# Patient Record
Sex: FEMALE | Race: WHITE | NOT HISPANIC OR LATINO | Employment: FULL TIME | URBAN - METROPOLITAN AREA
[De-identification: names, ages, dates, MRNs, and addresses within clinical notes are randomized per-mention and may not be internally consistent; named-entity substitution may affect disease eponyms.]

---

## 2020-01-28 ENCOUNTER — HISTORICAL (OUTPATIENT)
Dept: RADIOLOGY | Facility: HOSPITAL | Age: 30
End: 2020-01-28

## 2020-01-29 ENCOUNTER — HISTORICAL (OUTPATIENT)
Dept: RADIOLOGY | Facility: HOSPITAL | Age: 30
End: 2020-01-29

## 2020-02-13 ENCOUNTER — HISTORICAL (OUTPATIENT)
Dept: HEMATOLOGY/ONCOLOGY | Facility: CLINIC | Age: 30
End: 2020-02-13

## 2020-02-26 ENCOUNTER — HISTORICAL (OUTPATIENT)
Dept: RADIOLOGY | Facility: HOSPITAL | Age: 30
End: 2020-02-26

## 2020-02-27 ENCOUNTER — HISTORICAL (OUTPATIENT)
Dept: PREADMISSION TESTING | Facility: HOSPITAL | Age: 30
End: 2020-02-27

## 2020-02-27 LAB
ABS NEUT (OLG): 5.41 X10(3)/MCL (ref 2.1–9.2)
ALBUMIN SERPL-MCNC: 4.2 GM/DL (ref 3.4–5)
ALBUMIN/GLOB SERPL: 1.3 {RATIO}
ALP SERPL-CCNC: 81 UNIT/L (ref 38–126)
ALT SERPL-CCNC: 22 UNIT/L (ref 12–78)
AST SERPL-CCNC: 13 UNIT/L (ref 15–37)
BASOPHILS # BLD AUTO: 0 X10(3)/MCL (ref 0–0.2)
BASOPHILS NFR BLD AUTO: 0 %
BILIRUB SERPL-MCNC: 0.5 MG/DL (ref 0.2–1)
BILIRUBIN DIRECT+TOT PNL SERPL-MCNC: 0.1 MG/DL (ref 0–0.2)
BILIRUBIN DIRECT+TOT PNL SERPL-MCNC: 0.4 MG/DL (ref 0–0.8)
BUN SERPL-MCNC: 10 MG/DL (ref 7–18)
CALCIUM SERPL-MCNC: 9.5 MG/DL (ref 8.5–10.1)
CHLORIDE SERPL-SCNC: 105 MMOL/L (ref 98–107)
CO2 SERPL-SCNC: 27 MMOL/L (ref 21–32)
CREAT SERPL-MCNC: 0.79 MG/DL (ref 0.55–1.02)
EOSINOPHIL # BLD AUTO: 0.1 X10(3)/MCL (ref 0–0.9)
EOSINOPHIL NFR BLD AUTO: 1 %
ERYTHROCYTE [DISTWIDTH] IN BLOOD BY AUTOMATED COUNT: 12.1 % (ref 11.5–17)
GLOBULIN SER-MCNC: 3.3 GM/DL (ref 2.4–3.5)
GLUCOSE SERPL-MCNC: 73 MG/DL (ref 74–106)
HCT VFR BLD AUTO: 40.5 % (ref 37–47)
HGB BLD-MCNC: 13.1 GM/DL (ref 12–16)
LYMPHOCYTES # BLD AUTO: 1.7 X10(3)/MCL (ref 0.6–4.6)
LYMPHOCYTES NFR BLD AUTO: 21 %
MCH RBC QN AUTO: 29.8 PG (ref 27–31)
MCHC RBC AUTO-ENTMCNC: 32.3 GM/DL (ref 33–36)
MCV RBC AUTO: 92 FL (ref 80–94)
MONOCYTES # BLD AUTO: 0.6 X10(3)/MCL (ref 0.1–1.3)
MONOCYTES NFR BLD AUTO: 8 %
NEUTROPHILS # BLD AUTO: 5.41 X10(3)/MCL (ref 2.1–9.2)
NEUTROPHILS NFR BLD AUTO: 69 %
PLATELET # BLD AUTO: 332 X10(3)/MCL (ref 130–400)
PMV BLD AUTO: 11.2 FL (ref 9.4–12.4)
POTASSIUM SERPL-SCNC: 4.4 MMOL/L (ref 3.5–5.1)
PROT SERPL-MCNC: 7.5 GM/DL (ref 6.4–8.2)
RBC # BLD AUTO: 4.4 X10(6)/MCL (ref 4.2–5.4)
SODIUM SERPL-SCNC: 137 MMOL/L (ref 136–145)
WBC # SPEC AUTO: 7.9 X10(3)/MCL (ref 4.5–11.5)

## 2020-03-06 ENCOUNTER — HISTORICAL (OUTPATIENT)
Dept: SURGERY | Facility: HOSPITAL | Age: 30
End: 2020-03-06

## 2020-03-06 LAB — POC BETA-HCG (QUAL): NEGATIVE

## 2020-04-02 ENCOUNTER — HISTORICAL (OUTPATIENT)
Dept: ADMINISTRATIVE | Facility: HOSPITAL | Age: 30
End: 2020-04-02

## 2020-04-02 LAB
ABS NEUT (OLG): 4.21 X10(3)/MCL (ref 2.1–9.2)
ALBUMIN SERPL-MCNC: 4.1 GM/DL (ref 3.5–5)
ALBUMIN/GLOB SERPL: 1.2 RATIO (ref 1.1–2)
ALP SERPL-CCNC: 81 UNIT/L (ref 40–150)
ALT SERPL-CCNC: 12 UNIT/L (ref 0–55)
AST SERPL-CCNC: 16 UNIT/L (ref 5–34)
BASOPHILS # BLD AUTO: 0 X10(3)/MCL (ref 0–0.2)
BASOPHILS NFR BLD AUTO: 0.4 %
BILIRUB SERPL-MCNC: 0.4 MG/DL
BILIRUBIN DIRECT+TOT PNL SERPL-MCNC: 0.1 MG/DL (ref 0–0.5)
BILIRUBIN DIRECT+TOT PNL SERPL-MCNC: 0.3 MG/DL (ref 0–0.8)
BUN SERPL-MCNC: 9 MG/DL (ref 7–18.7)
CALCIUM SERPL-MCNC: 9.3 MG/DL (ref 8.4–10.2)
CHLORIDE SERPL-SCNC: 106 MMOL/L (ref 98–107)
CO2 SERPL-SCNC: 25 MMOL/L (ref 22–29)
CREAT SERPL-MCNC: 0.8 MG/DL (ref 0.55–1.02)
EOSINOPHIL # BLD AUTO: 0.1 X10(3)/MCL (ref 0–0.9)
EOSINOPHIL NFR BLD AUTO: 1.6 %
ERYTHROCYTE [DISTWIDTH] IN BLOOD BY AUTOMATED COUNT: 11.8 % (ref 11.5–17)
GLOBULIN SER-MCNC: 3.3 GM/DL (ref 2.4–3.5)
GLUCOSE SERPL-MCNC: 84 MG/DL (ref 74–100)
HCT VFR BLD AUTO: 36.8 % (ref 37–47)
HGB BLD-MCNC: 12.2 GM/DL (ref 12–16)
LYMPHOCYTES # BLD AUTO: 1.8 X10(3)/MCL (ref 0.6–4.6)
LYMPHOCYTES NFR BLD AUTO: 27.5 %
MCH RBC QN AUTO: 29.8 PG (ref 27–31)
MCHC RBC AUTO-ENTMCNC: 33.2 GM/DL (ref 33–36)
MCV RBC AUTO: 90 FL (ref 80–94)
MONOCYTES # BLD AUTO: 0.5 X10(3)/MCL (ref 0.1–1.3)
MONOCYTES NFR BLD AUTO: 7.3 %
NEUTROPHILS # BLD AUTO: 4.2 X10(3)/MCL (ref 2.1–9.2)
NEUTROPHILS NFR BLD AUTO: 63.1 %
PLATELET # BLD AUTO: 324 X10(3)/MCL (ref 130–400)
PMV BLD AUTO: 9.8 FL (ref 9.4–12.4)
POTASSIUM SERPL-SCNC: 4.3 MMOL/L (ref 3.5–5.1)
PROT SERPL-MCNC: 7.4 GM/DL (ref 6.4–8.3)
RBC # BLD AUTO: 4.09 X10(6)/MCL (ref 4.2–5.4)
SODIUM SERPL-SCNC: 139 MMOL/L (ref 136–145)
WBC # SPEC AUTO: 6.7 X10(3)/MCL (ref 4.5–11.5)

## 2020-04-13 ENCOUNTER — HISTORICAL (OUTPATIENT)
Dept: CARDIOLOGY | Facility: HOSPITAL | Age: 30
End: 2020-04-13

## 2020-04-15 ENCOUNTER — HISTORICAL (OUTPATIENT)
Dept: INFUSION THERAPY | Facility: HOSPITAL | Age: 30
End: 2020-04-15

## 2020-04-23 ENCOUNTER — HISTORICAL (OUTPATIENT)
Dept: INFUSION THERAPY | Facility: HOSPITAL | Age: 30
End: 2020-04-23

## 2020-04-23 ENCOUNTER — HISTORICAL (OUTPATIENT)
Dept: ADMINISTRATIVE | Facility: HOSPITAL | Age: 30
End: 2020-04-23

## 2020-04-23 LAB
ABS NEUT (OLG): 4.93 X10(3)/MCL (ref 2.1–9.2)
ANION GAP SERPL CALC-SCNC: 16 MMOL/L
B-HCG SERPL QL: NEGATIVE
BASOPHILS # BLD AUTO: 0 X10(3)/MCL (ref 0–0.2)
BASOPHILS NFR BLD AUTO: 0.4 %
BUN SERPL-MCNC: 13 MG/DL (ref 8–26)
CHLORIDE SERPL-SCNC: 104 MMOL/L (ref 98–109)
CREAT SERPL-MCNC: 0.8 MG/DL (ref 0.6–1.3)
EOSINOPHIL # BLD AUTO: 0.2 X10(3)/MCL (ref 0–0.9)
EOSINOPHIL NFR BLD AUTO: 2.1 %
ERYTHROCYTE [DISTWIDTH] IN BLOOD BY AUTOMATED COUNT: 12.3 % (ref 11.5–17)
GLUCOSE SERPL-MCNC: 95 MG/DL (ref 70–105)
HCT VFR BLD AUTO: 36.7 % (ref 37–47)
HCT VFR BLD CALC: 36 % (ref 38–51)
HGB BLD-MCNC: 12.1 GM/DL (ref 12–16)
HGB BLD-MCNC: 12.2 MG/DL (ref 12–17)
LYMPHOCYTES # BLD AUTO: 1.6 X10(3)/MCL (ref 0.6–4.6)
LYMPHOCYTES NFR BLD AUTO: 21.1 %
MCH RBC QN AUTO: 29.9 PG (ref 27–31)
MCHC RBC AUTO-ENTMCNC: 33 GM/DL (ref 33–36)
MCV RBC AUTO: 90.6 FL (ref 80–94)
MONOCYTES # BLD AUTO: 0.8 X10(3)/MCL (ref 0.1–1.3)
MONOCYTES NFR BLD AUTO: 10.3 %
NEUTROPHILS # BLD AUTO: 4.9 X10(3)/MCL (ref 2.1–9.2)
NEUTROPHILS NFR BLD AUTO: 66 %
PLATELET # BLD AUTO: 296 X10(3)/MCL (ref 130–400)
PMV BLD AUTO: 10 FL (ref 9.4–12.4)
POC IONIZED CALCIUM: 1.17 MMOL/L (ref 1.12–1.32)
POC TCO2: 22 MMOL/L (ref 24–29)
POTASSIUM BLD-SCNC: 4.1 MMOL/L (ref 3.5–4.9)
RBC # BLD AUTO: 4.05 X10(6)/MCL (ref 4.2–5.4)
SODIUM BLD-SCNC: 137 MMOL/L (ref 138–146)
WBC # SPEC AUTO: 7.5 X10(3)/MCL (ref 4.5–11.5)

## 2020-04-30 ENCOUNTER — HISTORICAL (OUTPATIENT)
Dept: INFUSION THERAPY | Facility: HOSPITAL | Age: 30
End: 2020-04-30

## 2020-05-07 ENCOUNTER — HISTORICAL (OUTPATIENT)
Dept: INFUSION THERAPY | Facility: HOSPITAL | Age: 30
End: 2020-05-07

## 2020-05-07 LAB
ABS NEUT (OLG): 9.51 X10(3)/MCL (ref 2.1–9.2)
ALBUMIN SERPL-MCNC: 4.2 GM/DL (ref 3.5–5)
ALBUMIN/GLOB SERPL: 1.4 RATIO (ref 1.1–2)
ALP SERPL-CCNC: 120 UNIT/L (ref 40–150)
ALT SERPL-CCNC: 34 UNIT/L (ref 0–55)
AST SERPL-CCNC: 19 UNIT/L (ref 5–34)
BASOPHILS # BLD AUTO: 0 X10(3)/MCL (ref 0–0.2)
BASOPHILS NFR BLD AUTO: 0.4 %
BILIRUB SERPL-MCNC: 0.3 MG/DL
BILIRUBIN DIRECT+TOT PNL SERPL-MCNC: 0.1 MG/DL (ref 0–0.5)
BILIRUBIN DIRECT+TOT PNL SERPL-MCNC: 0.2 MG/DL (ref 0–0.8)
BUN SERPL-MCNC: 9 MG/DL (ref 7–18.7)
CALCIUM SERPL-MCNC: 9.1 MG/DL (ref 8.4–10.2)
CHLORIDE SERPL-SCNC: 107 MMOL/L (ref 98–107)
CO2 SERPL-SCNC: 25 MMOL/L (ref 22–29)
CREAT SERPL-MCNC: 0.75 MG/DL (ref 0.55–1.02)
EOSINOPHIL # BLD AUTO: 0 X10(3)/MCL (ref 0–0.9)
EOSINOPHIL NFR BLD AUTO: 0.3 %
ERYTHROCYTE [DISTWIDTH] IN BLOOD BY AUTOMATED COUNT: 12.6 % (ref 11.5–17)
GLOBULIN SER-MCNC: 3 GM/DL (ref 2.4–3.5)
GLUCOSE SERPL-MCNC: 78 MG/DL (ref 74–100)
HCT VFR BLD AUTO: 35 % (ref 37–47)
HGB BLD-MCNC: 11.5 GM/DL (ref 12–16)
LYMPHOCYTES # BLD AUTO: 2.3 X10(3)/MCL (ref 0.6–4.6)
LYMPHOCYTES NFR BLD AUTO: 18.4 %
MCH RBC QN AUTO: 29.7 PG (ref 27–31)
MCHC RBC AUTO-ENTMCNC: 32.9 GM/DL (ref 33–36)
MCV RBC AUTO: 90.4 FL (ref 80–94)
MONOCYTES # BLD AUTO: 0.6 X10(3)/MCL (ref 0.1–1.3)
MONOCYTES NFR BLD AUTO: 4.8 %
NEUTROPHILS # BLD AUTO: 9.5 X10(3)/MCL (ref 2.1–9.2)
NEUTROPHILS NFR BLD AUTO: 74.8 %
PLATELET # BLD AUTO: 227 X10(3)/MCL (ref 130–400)
PMV BLD AUTO: 10 FL (ref 9.4–12.4)
POTASSIUM SERPL-SCNC: 3.8 MMOL/L (ref 3.5–5.1)
PROT SERPL-MCNC: 7.2 GM/DL (ref 6.4–8.3)
RBC # BLD AUTO: 3.87 X10(6)/MCL (ref 4.2–5.4)
SODIUM SERPL-SCNC: 141 MMOL/L (ref 136–145)
WBC # SPEC AUTO: 12.7 X10(3)/MCL (ref 4.5–11.5)

## 2020-05-14 ENCOUNTER — HISTORICAL (OUTPATIENT)
Dept: INFUSION THERAPY | Facility: HOSPITAL | Age: 30
End: 2020-05-14

## 2020-05-14 LAB
ABS NEUT (OLG): 4.29 X10(3)/MCL (ref 2.1–9.2)
ANION GAP SERPL CALC-SCNC: 14 MMOL/L
BASOPHILS # BLD AUTO: 0 X10(3)/MCL (ref 0–0.2)
BASOPHILS NFR BLD AUTO: 0.5 %
BUN SERPL-MCNC: 13 MG/DL (ref 8–26)
CHLORIDE SERPL-SCNC: 108 MMOL/L (ref 98–109)
CREAT SERPL-MCNC: 0.7 MG/DL (ref 0.6–1.3)
EOSINOPHIL # BLD AUTO: 0 X10(3)/MCL (ref 0–0.9)
EOSINOPHIL NFR BLD AUTO: 0.5 %
ERYTHROCYTE [DISTWIDTH] IN BLOOD BY AUTOMATED COUNT: 13 % (ref 11.5–17)
GLUCOSE SERPL-MCNC: 93 MG/DL (ref 70–105)
HCT VFR BLD AUTO: 34.9 % (ref 37–47)
HCT VFR BLD CALC: 35 % (ref 38–51)
HGB BLD-MCNC: 11.6 GM/DL (ref 12–16)
HGB BLD-MCNC: 11.9 MG/DL (ref 12–17)
LYMPHOCYTES # BLD AUTO: 1.4 X10(3)/MCL (ref 0.6–4.6)
LYMPHOCYTES NFR BLD AUTO: 21.1 %
MCH RBC QN AUTO: 30.1 PG (ref 27–31)
MCHC RBC AUTO-ENTMCNC: 33.2 GM/DL (ref 33–36)
MCV RBC AUTO: 90.4 FL (ref 80–94)
MONOCYTES # BLD AUTO: 0.7 X10(3)/MCL (ref 0.1–1.3)
MONOCYTES NFR BLD AUTO: 10.5 %
NEUTROPHILS # BLD AUTO: 4.3 X10(3)/MCL (ref 2.1–9.2)
NEUTROPHILS NFR BLD AUTO: 67.1 %
PLATELET # BLD AUTO: 403 X10(3)/MCL (ref 130–400)
PMV BLD AUTO: 9.9 FL (ref 9.4–12.4)
POC IONIZED CALCIUM: 1.11 MMOL/L (ref 1.12–1.32)
POC TCO2: 21 MMOL/L (ref 24–29)
POTASSIUM BLD-SCNC: 4.1 MMOL/L (ref 3.5–4.9)
RBC # BLD AUTO: 3.86 X10(6)/MCL (ref 4.2–5.4)
SODIUM BLD-SCNC: 138 MMOL/L (ref 138–146)
WBC # SPEC AUTO: 6.4 X10(3)/MCL (ref 4.5–11.5)

## 2020-05-21 ENCOUNTER — HISTORICAL (OUTPATIENT)
Dept: INFUSION THERAPY | Facility: HOSPITAL | Age: 30
End: 2020-05-21

## 2020-05-28 ENCOUNTER — HISTORICAL (OUTPATIENT)
Dept: INFUSION THERAPY | Facility: HOSPITAL | Age: 30
End: 2020-05-28

## 2020-06-03 ENCOUNTER — HISTORICAL (OUTPATIENT)
Dept: INFUSION THERAPY | Facility: HOSPITAL | Age: 30
End: 2020-06-03

## 2020-06-03 LAB
ABS NEUT (OLG): 3.49 X10(3)/MCL (ref 2.1–9.2)
ALBUMIN SERPL-MCNC: 3.8 GM/DL (ref 3.5–5)
ALP SERPL-CCNC: 97 UNIT/L (ref 40–150)
ALT SERPL-CCNC: 23 UNIT/L (ref 0–55)
ANION GAP SERPL CALC-SCNC: 16 MMOL/L
AST SERPL-CCNC: 17 UNIT/L (ref 5–34)
B-HCG SERPL QL: NEGATIVE
BASOPHILS # BLD AUTO: 0 X10(3)/MCL (ref 0–0.2)
BASOPHILS NFR BLD AUTO: 0.7 %
BILIRUB SERPL-MCNC: 0.3 MG/DL
BILIRUBIN DIRECT+TOT PNL SERPL-MCNC: 0.1 MG/DL (ref 0–0.5)
BILIRUBIN DIRECT+TOT PNL SERPL-MCNC: 0.2 MG/DL (ref 0–0.8)
BUN SERPL-MCNC: 9 MG/DL (ref 8–26)
CHLORIDE SERPL-SCNC: 105 MMOL/L (ref 98–109)
CREAT SERPL-MCNC: 0.7 MG/DL (ref 0.6–1.3)
EOSINOPHIL # BLD AUTO: 0.1 X10(3)/MCL (ref 0–0.9)
EOSINOPHIL NFR BLD AUTO: 1 %
ERYTHROCYTE [DISTWIDTH] IN BLOOD BY AUTOMATED COUNT: 13.3 % (ref 11.5–17)
GLUCOSE SERPL-MCNC: 87 MG/DL (ref 70–105)
HCT VFR BLD AUTO: 33.5 % (ref 37–47)
HCT VFR BLD CALC: 33 % (ref 38–51)
HGB BLD-MCNC: 11 GM/DL (ref 12–16)
HGB BLD-MCNC: 11.2 MG/DL (ref 12–17)
LIVER PROFILE INTERP: NORMAL
LYMPHOCYTES # BLD AUTO: 1.8 X10(3)/MCL (ref 0.6–4.6)
LYMPHOCYTES NFR BLD AUTO: 29.5 %
MCH RBC QN AUTO: 29.9 PG (ref 27–31)
MCHC RBC AUTO-ENTMCNC: 32.8 GM/DL (ref 33–36)
MCV RBC AUTO: 91 FL (ref 80–94)
MONOCYTES # BLD AUTO: 0.6 X10(3)/MCL (ref 0.1–1.3)
MONOCYTES NFR BLD AUTO: 10.8 %
NEUTROPHILS # BLD AUTO: 3.5 X10(3)/MCL (ref 2.1–9.2)
NEUTROPHILS NFR BLD AUTO: 57.8 %
PLATELET # BLD AUTO: 304 X10(3)/MCL (ref 130–400)
PMV BLD AUTO: 9.9 FL (ref 9.4–12.4)
POC IONIZED CALCIUM: 1.24 MMOL/L (ref 1.12–1.32)
POC TCO2: 25 MMOL/L (ref 24–29)
POTASSIUM BLD-SCNC: 3.9 MMOL/L (ref 3.5–4.9)
PROT SERPL-MCNC: 7 GM/DL (ref 6.4–8.3)
RBC # BLD AUTO: 3.68 X10(6)/MCL (ref 4.2–5.4)
SODIUM BLD-SCNC: 142 MMOL/L (ref 138–146)
WBC # SPEC AUTO: 6 X10(3)/MCL (ref 4.5–11.5)

## 2020-06-11 ENCOUNTER — HISTORICAL (OUTPATIENT)
Dept: INFUSION THERAPY | Facility: HOSPITAL | Age: 30
End: 2020-06-11

## 2020-06-18 ENCOUNTER — HISTORICAL (OUTPATIENT)
Dept: INFUSION THERAPY | Facility: HOSPITAL | Age: 30
End: 2020-06-18

## 2020-06-24 ENCOUNTER — HISTORICAL (OUTPATIENT)
Dept: INFUSION THERAPY | Facility: HOSPITAL | Age: 30
End: 2020-06-24

## 2020-06-24 LAB
ABS NEUT (OLG): 3.27 X10(3)/MCL (ref 2.1–9.2)
ALBUMIN SERPL-MCNC: 3.8 GM/DL (ref 3.5–5)
ALP SERPL-CCNC: 93 UNIT/L (ref 40–150)
ALT SERPL-CCNC: 28 UNIT/L (ref 0–55)
ANION GAP SERPL CALC-SCNC: 16 MMOL/L
AST SERPL-CCNC: 18 UNIT/L (ref 5–34)
BASOPHILS # BLD AUTO: 0 X10(3)/MCL (ref 0–0.2)
BASOPHILS NFR BLD AUTO: 0.7 %
BILIRUB SERPL-MCNC: 0.3 MG/DL
BILIRUBIN DIRECT+TOT PNL SERPL-MCNC: 0.1 MG/DL (ref 0–0.5)
BILIRUBIN DIRECT+TOT PNL SERPL-MCNC: 0.2 MG/DL (ref 0–0.8)
BUN SERPL-MCNC: 11 MG/DL (ref 8–26)
CHLORIDE SERPL-SCNC: 106 MMOL/L (ref 98–109)
CREAT SERPL-MCNC: 0.7 MG/DL (ref 0.6–1.3)
EOSINOPHIL # BLD AUTO: 0 X10(3)/MCL (ref 0–0.9)
EOSINOPHIL NFR BLD AUTO: 0.9 %
ERYTHROCYTE [DISTWIDTH] IN BLOOD BY AUTOMATED COUNT: 14.4 % (ref 11.5–17)
GLUCOSE SERPL-MCNC: 93 MG/DL (ref 70–105)
HCT VFR BLD AUTO: 33.5 % (ref 37–47)
HCT VFR BLD CALC: 35 % (ref 38–51)
HGB BLD-MCNC: 10.9 GM/DL (ref 12–16)
HGB BLD-MCNC: 11.9 MG/DL (ref 12–17)
LIVER PROFILE INTERP: NORMAL
LYMPHOCYTES # BLD AUTO: 1.5 X10(3)/MCL (ref 0.6–4.6)
LYMPHOCYTES NFR BLD AUTO: 27.8 %
MCH RBC QN AUTO: 30.4 PG (ref 27–31)
MCHC RBC AUTO-ENTMCNC: 32.5 GM/DL (ref 33–36)
MCV RBC AUTO: 93.3 FL (ref 80–94)
MONOCYTES # BLD AUTO: 0.5 X10(3)/MCL (ref 0.1–1.3)
MONOCYTES NFR BLD AUTO: 9.9 %
NEUTROPHILS # BLD AUTO: 3.3 X10(3)/MCL (ref 2.1–9.2)
NEUTROPHILS NFR BLD AUTO: 60.3 %
PLATELET # BLD AUTO: 342 X10(3)/MCL (ref 130–400)
PMV BLD AUTO: 9.1 FL (ref 9.4–12.4)
POC IONIZED CALCIUM: 1.18 MMOL/L (ref 1.12–1.32)
POC TCO2: 26 MMOL/L (ref 24–29)
POTASSIUM BLD-SCNC: 4 MMOL/L (ref 3.5–4.9)
PROT SERPL-MCNC: 7 GM/DL (ref 6.4–8.3)
RBC # BLD AUTO: 3.59 X10(6)/MCL (ref 4.2–5.4)
SODIUM BLD-SCNC: 142 MMOL/L (ref 138–146)
WBC # SPEC AUTO: 5.4 X10(3)/MCL (ref 4.5–11.5)

## 2020-07-09 ENCOUNTER — HISTORICAL (OUTPATIENT)
Dept: INFUSION THERAPY | Facility: HOSPITAL | Age: 30
End: 2020-07-09

## 2020-08-04 ENCOUNTER — HISTORICAL (OUTPATIENT)
Dept: ADMINISTRATIVE | Facility: HOSPITAL | Age: 30
End: 2020-08-04

## 2020-08-04 LAB
ABS NEUT (OLG): 5.96 X10(3)/MCL (ref 2.1–9.2)
ALBUMIN SERPL-MCNC: 4.3 GM/DL (ref 3.5–5)
ALBUMIN/GLOB SERPL: 1.4 RATIO (ref 1.1–2)
ALP SERPL-CCNC: 79 UNIT/L (ref 40–150)
ALT SERPL-CCNC: 14 UNIT/L (ref 0–55)
AST SERPL-CCNC: 18 UNIT/L (ref 5–34)
BASOPHILS # BLD AUTO: 0 X10(3)/MCL (ref 0–0.2)
BASOPHILS NFR BLD AUTO: 0.4 %
BILIRUB SERPL-MCNC: 0.5 MG/DL
BILIRUBIN DIRECT+TOT PNL SERPL-MCNC: 0.2 MG/DL (ref 0–0.5)
BILIRUBIN DIRECT+TOT PNL SERPL-MCNC: 0.3 MG/DL (ref 0–0.8)
BUN SERPL-MCNC: 7 MG/DL (ref 7–18.7)
CALCIUM SERPL-MCNC: 9.8 MG/DL (ref 8.4–10.2)
CHLORIDE SERPL-SCNC: 106 MMOL/L (ref 98–107)
CO2 SERPL-SCNC: 26 MMOL/L (ref 22–29)
CREAT SERPL-MCNC: 0.79 MG/DL (ref 0.55–1.02)
EOSINOPHIL # BLD AUTO: 0.1 X10(3)/MCL (ref 0–0.9)
EOSINOPHIL NFR BLD AUTO: 1.7 %
ERYTHROCYTE [DISTWIDTH] IN BLOOD BY AUTOMATED COUNT: 12.3 % (ref 11.5–17)
GLOBULIN SER-MCNC: 3 GM/DL (ref 2.4–3.5)
GLUCOSE SERPL-MCNC: 102 MG/DL (ref 74–100)
HCT VFR BLD AUTO: 35.5 % (ref 37–47)
HGB BLD-MCNC: 11.8 GM/DL (ref 12–16)
LYMPHOCYTES # BLD AUTO: 1.5 X10(3)/MCL (ref 0.6–4.6)
LYMPHOCYTES NFR BLD AUTO: 18.4 %
MCH RBC QN AUTO: 31.1 PG (ref 27–31)
MCHC RBC AUTO-ENTMCNC: 33.2 GM/DL (ref 33–36)
MCV RBC AUTO: 93.4 FL (ref 80–94)
MONOCYTES # BLD AUTO: 0.5 X10(3)/MCL (ref 0.1–1.3)
MONOCYTES NFR BLD AUTO: 6.1 %
NEUTROPHILS # BLD AUTO: 6 X10(3)/MCL (ref 2.1–9.2)
NEUTROPHILS NFR BLD AUTO: 73.3 %
PLATELET # BLD AUTO: 335 X10(3)/MCL (ref 130–400)
PMV BLD AUTO: 9.9 FL (ref 9.4–12.4)
POTASSIUM SERPL-SCNC: 4.2 MMOL/L (ref 3.5–5.1)
PROT SERPL-MCNC: 7.3 GM/DL (ref 6.4–8.3)
RBC # BLD AUTO: 3.8 X10(6)/MCL (ref 4.2–5.4)
SODIUM SERPL-SCNC: 140 MMOL/L (ref 136–145)
WBC # SPEC AUTO: 8.1 X10(3)/MCL (ref 4.5–11.5)

## 2020-08-05 ENCOUNTER — HISTORICAL (OUTPATIENT)
Dept: INFUSION THERAPY | Facility: HOSPITAL | Age: 30
End: 2020-08-05

## 2020-09-02 ENCOUNTER — HISTORICAL (OUTPATIENT)
Dept: INFUSION THERAPY | Facility: HOSPITAL | Age: 30
End: 2020-09-02

## 2020-09-10 ENCOUNTER — HISTORICAL (OUTPATIENT)
Dept: HEMATOLOGY/ONCOLOGY | Facility: CLINIC | Age: 30
End: 2020-09-10

## 2020-09-10 LAB
ABS NEUT (OLG): 3.59 X10(3)/MCL (ref 2.1–9.2)
ALBUMIN SERPL-MCNC: 4.1 GM/DL (ref 3.5–5)
ALBUMIN/GLOB SERPL: 1.4 RATIO (ref 1.1–2)
ALP SERPL-CCNC: 84 UNIT/L (ref 40–150)
ALT SERPL-CCNC: 13 UNIT/L (ref 0–55)
AST SERPL-CCNC: 17 UNIT/L (ref 5–34)
BASOPHILS # BLD AUTO: 0 X10(3)/MCL (ref 0–0.2)
BASOPHILS NFR BLD AUTO: 1 %
BILIRUB SERPL-MCNC: 0.3 MG/DL
BILIRUBIN DIRECT+TOT PNL SERPL-MCNC: 0.1 MG/DL (ref 0–0.5)
BILIRUBIN DIRECT+TOT PNL SERPL-MCNC: 0.2 MG/DL (ref 0–0.8)
BUN SERPL-MCNC: 11.4 MG/DL (ref 7–18.7)
CALCIUM SERPL-MCNC: 9.6 MG/DL (ref 8.4–10.2)
CHLORIDE SERPL-SCNC: 107 MMOL/L (ref 98–107)
CO2 SERPL-SCNC: 23 MMOL/L (ref 22–29)
CREAT SERPL-MCNC: 0.84 MG/DL (ref 0.55–1.02)
EOSINOPHIL # BLD AUTO: 0.2 X10(3)/MCL (ref 0–0.9)
EOSINOPHIL NFR BLD AUTO: 4 %
ERYTHROCYTE [DISTWIDTH] IN BLOOD BY AUTOMATED COUNT: 11.6 % (ref 11.5–17)
GLOBULIN SER-MCNC: 2.9 GM/DL (ref 2.4–3.5)
GLUCOSE SERPL-MCNC: 77 MG/DL (ref 74–100)
HCT VFR BLD AUTO: 38.1 % (ref 37–47)
HGB BLD-MCNC: 12.3 GM/DL (ref 12–16)
LYMPHOCYTES # BLD AUTO: 1.7 X10(3)/MCL (ref 0.6–4.6)
LYMPHOCYTES NFR BLD AUTO: 28 %
MCH RBC QN AUTO: 29.9 PG (ref 27–31)
MCHC RBC AUTO-ENTMCNC: 32.3 GM/DL (ref 33–36)
MCV RBC AUTO: 92.5 FL (ref 80–94)
MONOCYTES # BLD AUTO: 0.5 X10(3)/MCL (ref 0.1–1.3)
MONOCYTES NFR BLD AUTO: 8 %
NEUTROPHILS # BLD AUTO: 3.59 X10(3)/MCL (ref 2.1–9.2)
NEUTROPHILS NFR BLD AUTO: 59 %
PLATELET # BLD AUTO: 320 X10(3)/MCL (ref 130–400)
PMV BLD AUTO: 10.7 FL (ref 9.4–12.4)
POTASSIUM SERPL-SCNC: 4.5 MMOL/L (ref 3.5–5.1)
PROT SERPL-MCNC: 7 GM/DL (ref 6.4–8.3)
RBC # BLD AUTO: 4.12 X10(6)/MCL (ref 4.2–5.4)
SODIUM SERPL-SCNC: 142 MMOL/L (ref 136–145)
WBC # SPEC AUTO: 6.1 X10(3)/MCL (ref 4.5–11.5)

## 2020-10-01 ENCOUNTER — HISTORICAL (OUTPATIENT)
Dept: INFUSION THERAPY | Facility: HOSPITAL | Age: 30
End: 2020-10-01

## 2020-11-24 ENCOUNTER — HISTORICAL (OUTPATIENT)
Dept: INFUSION THERAPY | Facility: HOSPITAL | Age: 30
End: 2020-11-24

## 2020-12-15 ENCOUNTER — HISTORICAL (OUTPATIENT)
Dept: ADMINISTRATIVE | Facility: HOSPITAL | Age: 30
End: 2020-12-15

## 2020-12-23 ENCOUNTER — HISTORICAL (OUTPATIENT)
Dept: INFUSION THERAPY | Facility: HOSPITAL | Age: 30
End: 2020-12-23

## 2021-06-02 ENCOUNTER — HISTORICAL (OUTPATIENT)
Dept: ADMINISTRATIVE | Facility: HOSPITAL | Age: 31
End: 2021-06-02

## 2021-06-02 LAB
ABS NEUT (OLG): 3.39 X10(3)/MCL (ref 2.1–9.2)
ALBUMIN SERPL-MCNC: 4 GM/DL (ref 3.5–5)
ALP SERPL-CCNC: 85 UNIT/L (ref 40–150)
ALT SERPL-CCNC: 12 UNIT/L (ref 0–55)
ANION GAP SERPL CALC-SCNC: 17 MMOL/L
AST SERPL-CCNC: 19 UNIT/L (ref 5–34)
BASOPHILS # BLD AUTO: 0 X10(3)/MCL (ref 0–0.2)
BASOPHILS NFR BLD AUTO: 0.4 %
BILIRUB SERPL-MCNC: 0.3 MG/DL
BILIRUBIN DIRECT+TOT PNL SERPL-MCNC: 0.1 MG/DL (ref 0–0.5)
BILIRUBIN DIRECT+TOT PNL SERPL-MCNC: 0.2 MG/DL (ref 0–0.8)
BUN SERPL-MCNC: 12 MG/DL (ref 8–26)
CHLORIDE SERPL-SCNC: 106 MMOL/L (ref 98–109)
CREAT SERPL-MCNC: 0.7 MG/DL (ref 0.6–1.3)
EOSINOPHIL # BLD AUTO: 0.1 X10(3)/MCL (ref 0–0.9)
EOSINOPHIL NFR BLD AUTO: 2.3 %
ERYTHROCYTE [DISTWIDTH] IN BLOOD BY AUTOMATED COUNT: 12.4 % (ref 11.5–17)
EST CREAT CLEARANCE SER (OHS): 86.36 ML/MIN
GLUCOSE SERPL-MCNC: 91 MG/DL (ref 70–105)
HCT VFR BLD AUTO: 35.4 % (ref 37–47)
HCT VFR BLD CALC: 36 % (ref 38–51)
HGB BLD-MCNC: 11.8 GM/DL (ref 12–16)
HGB BLD-MCNC: 12.2 MG/DL (ref 12–17)
LIVER PROFILE INTERP: NORMAL
LYMPHOCYTES # BLD AUTO: 1.6 X10(3)/MCL (ref 0.6–4.6)
LYMPHOCYTES NFR BLD AUTO: 28.4 %
MCH RBC QN AUTO: 29.4 PG (ref 27–31)
MCHC RBC AUTO-ENTMCNC: 33.3 GM/DL (ref 33–36)
MCV RBC AUTO: 88.1 FL (ref 80–94)
MONOCYTES # BLD AUTO: 0.4 X10(3)/MCL (ref 0.1–1.3)
MONOCYTES NFR BLD AUTO: 7.9 %
NEUTROPHILS # BLD AUTO: 3.4 X10(3)/MCL (ref 2.1–9.2)
NEUTROPHILS NFR BLD AUTO: 61 %
PLATELET # BLD AUTO: 304 X10(3)/MCL (ref 130–400)
PMV BLD AUTO: 10.1 FL (ref 9.4–12.4)
POC IONIZED CALCIUM: 1.23 MMOL/L (ref 1.12–1.32)
POC TCO2: 22 MMOL/L (ref 24–29)
POTASSIUM BLD-SCNC: 4.3 MMOL/L (ref 3.5–4.9)
PROT SERPL-MCNC: 7 GM/DL (ref 6.4–8.3)
RBC # BLD AUTO: 4.02 X10(6)/MCL (ref 4.2–5.4)
SODIUM BLD-SCNC: 140 MMOL/L (ref 138–146)
WBC # SPEC AUTO: 5.6 X10(3)/MCL (ref 4.5–11.5)

## 2021-06-08 LAB
HUMAN PAPILLOMAVIRUS (HPV): NORMAL
PAP RECOMMENDATION EXT: NORMAL
PAP SMEAR: NORMAL

## 2021-11-16 ENCOUNTER — HISTORICAL (OUTPATIENT)
Dept: HEMATOLOGY/ONCOLOGY | Facility: CLINIC | Age: 31
End: 2021-11-16

## 2021-11-16 LAB
ABS NEUT (OLG): 3.59 X10(3)/MCL (ref 2.1–9.2)
ALBUMIN SERPL-MCNC: 4.2 GM/DL (ref 3.5–5)
ALBUMIN/GLOB SERPL: 1.4 RATIO (ref 1.1–2)
ALP SERPL-CCNC: 69 UNIT/L (ref 40–150)
ALT SERPL-CCNC: 14 UNIT/L (ref 0–55)
AST SERPL-CCNC: 19 UNIT/L (ref 5–34)
BASOPHILS # BLD AUTO: 0 X10(3)/MCL (ref 0–0.2)
BASOPHILS NFR BLD AUTO: 0.5 %
BILIRUB SERPL-MCNC: 0.4 MG/DL
BILIRUBIN DIRECT+TOT PNL SERPL-MCNC: 0.2 MG/DL (ref 0–0.5)
BILIRUBIN DIRECT+TOT PNL SERPL-MCNC: 0.2 MG/DL (ref 0–0.8)
BUN SERPL-MCNC: 8.5 MG/DL (ref 7–18.7)
CALCIUM SERPL-MCNC: 9.6 MG/DL (ref 8.7–10.5)
CHLORIDE SERPL-SCNC: 108 MMOL/L (ref 98–107)
CO2 SERPL-SCNC: 25 MMOL/L (ref 22–29)
CREAT SERPL-MCNC: 0.8 MG/DL (ref 0.55–1.02)
EOSINOPHIL # BLD AUTO: 0.1 X10(3)/MCL (ref 0–0.9)
EOSINOPHIL NFR BLD AUTO: 2.3 %
ERYTHROCYTE [DISTWIDTH] IN BLOOD BY AUTOMATED COUNT: 12.4 % (ref 11.5–17)
FERRITIN SERPL-MCNC: 29.56 NG/ML (ref 4.63–204)
FOLATE SERPL-MCNC: 37.2 NG/ML (ref 7–31.4)
GLOBULIN SER-MCNC: 2.9 GM/DL (ref 2.4–3.5)
GLUCOSE SERPL-MCNC: 88 MG/DL (ref 74–100)
HCT VFR BLD AUTO: 36.5 % (ref 37–47)
HGB BLD-MCNC: 11.9 GM/DL (ref 12–16)
IRON SATN MFR SERPL: 25 % (ref 20–50)
IRON SERPL-MCNC: 104 UG/DL (ref 50–170)
LYMPHOCYTES # BLD AUTO: 1.6 X10(3)/MCL (ref 0.6–4.6)
LYMPHOCYTES NFR BLD AUTO: 27.2 %
MCH RBC QN AUTO: 30 PG (ref 27–31)
MCHC RBC AUTO-ENTMCNC: 32.6 GM/DL (ref 33–36)
MCV RBC AUTO: 91.9 FL (ref 80–94)
MONOCYTES # BLD AUTO: 0.4 X10(3)/MCL (ref 0.1–1.3)
MONOCYTES NFR BLD AUTO: 7.8 %
NEUTROPHILS # BLD AUTO: 3.6 X10(3)/MCL (ref 2.1–9.2)
NEUTROPHILS NFR BLD AUTO: 62.2 %
PLATELET # BLD AUTO: 310 X10(3)/MCL (ref 130–400)
PMV BLD AUTO: 9.9 FL (ref 9.4–12.4)
POTASSIUM SERPL-SCNC: 4.5 MMOL/L (ref 3.5–5.1)
PROT SERPL-MCNC: 7.1 GM/DL (ref 6.4–8.3)
RBC # BLD AUTO: 3.97 X10(6)/MCL (ref 4.2–5.4)
SODIUM SERPL-SCNC: 141 MMOL/L (ref 136–145)
TIBC SERPL-MCNC: 304 UG/DL (ref 70–310)
TIBC SERPL-MCNC: 408 UG/DL (ref 250–450)
TRANSFERRIN SERPL-MCNC: 356 MG/DL (ref 180–382)
VIT B12 SERPL-MCNC: 709 PG/ML (ref 213–816)
WBC # SPEC AUTO: 5.8 X10(3)/MCL (ref 4.5–11.5)

## 2022-02-15 ENCOUNTER — HISTORICAL (OUTPATIENT)
Dept: HEMATOLOGY/ONCOLOGY | Facility: CLINIC | Age: 32
End: 2022-02-15

## 2022-02-15 LAB
ALBUMIN SERPL-MCNC: 4.1 G/DL (ref 3.5–5)
ALBUMIN/GLOB SERPL: 1.2 {RATIO} (ref 1.1–2)
ALP SERPL-CCNC: 98 U/L (ref 40–150)
ALT SERPL-CCNC: 14 U/L (ref 0–55)
AST SERPL-CCNC: 16 U/L (ref 5–34)
BILIRUB SERPL-MCNC: 0.2 MG/DL
BILIRUBIN DIRECT+TOT PNL SERPL-MCNC: 0.1 (ref 0–0.5)
BILIRUBIN DIRECT+TOT PNL SERPL-MCNC: 0.1 (ref 0–0.8)
BUN SERPL-MCNC: 6 MG/DL (ref 7–18.7)
CALCIUM SERPL-MCNC: 9.8 MG/DL (ref 8.7–10.5)
CHLORIDE SERPL-SCNC: 106 MMOL/L (ref 98–107)
CO2 SERPL-SCNC: 26 MMOL/L (ref 22–29)
CREAT SERPL-MCNC: 0.73 MG/DL (ref 0.55–1.02)
GLOBULIN SER-MCNC: 3.5 G/DL (ref 2.4–3.5)
GLUCOSE SERPL-MCNC: 81 MG/DL (ref 74–100)
HEMOLYSIS INTERF INDEX SERPL-ACNC: 7
ICTERIC INTERF INDEX SERPL-ACNC: 0
LIPEMIC INTERF INDEX SERPL-ACNC: 2
POTASSIUM SERPL-SCNC: 4.1 MMOL/L (ref 3.5–5.1)
PROT SERPL-MCNC: 7.6 G/DL (ref 6.4–8.3)
SODIUM SERPL-SCNC: 140 MMOL/L (ref 136–145)

## 2022-03-28 ENCOUNTER — HISTORICAL (OUTPATIENT)
Dept: RADIOLOGY | Facility: HOSPITAL | Age: 32
End: 2022-03-28

## 2022-03-28 ENCOUNTER — HISTORICAL (OUTPATIENT)
Dept: ADMINISTRATIVE | Facility: HOSPITAL | Age: 32
End: 2022-03-28

## 2022-04-30 NOTE — PROGRESS NOTES
Patient:   Radha Yen            MRN: [id number]            FIN: [id number]               Age:   30 years     Sex:  Female     :  1990   Associated Diagnoses:   None   Author:   Nilton Pires MD      Chief Complaint   6/3/2020 8:53 CDT        She has ringworms, multiple on arms and legs      Visit Information   Referring physician: Dina Caldwell  Current Treatment: Taxotere/Cytoxan 2020  Diagnosis :       AJCC 8th ed clinical anatomic stage IIA / prognostic stage IA (cT1 cN0 M0) left invasive ductal carcinoma grade 1 ER 98.47 %, RI 99.01%, HER2 cher 0 - negative on IHC.    Final pathology revealed [id number] ed pathological anatomic stage IA / prognostic stage IA (pT1c(mf) pN1a M0) left breast INVASIVE DUCTAL CARCINOMA, MULTIFOCAL (X2)  : 3:00 O'CLOCK 6 CMFN: INVASIVE DUCTAL CARCINOMA, GRADE 2 (2.0 CM)   3:00 O'CLOCK 3 CMFN: INVASIVE MUCINOUS CARCINOMA, GRADE 1 (0.2 CM.),   DUCTAL CARCINOMA IN SITU, LOW GRADE CRIBRIFORM TYPE, MULTIFOCAL (X3)  . INVASIVE CARCINOMA AND DCIS ARE 1.0 CM FROM THE ANTERIOR/SKIN MARGIN.   ONE OF 23 LYMPH NODES POSITIVE FOR METASTATIC CARCINOMA.      Summary:  1. Breast pain nipple discharge November- 2019  2.  Palpable mass 2020  3.  Bilateral diagnostic mammogram and ultrasound 2020: Irregular high density mass 3:00 posterior depth and middle depth with satellite lesion.  1.2 cm from the base of the nipple, extent of calcifications 8 cm.  Ultrasound of the mass at 3 o'clock position 6 cm from nipple 1.7 x 2 x 1.1 cm.  With skin retraction, satellite mass at 3 o'clock position 0.4 x 0.4 x 0.2, no evidence of axillary adenopathy  4.  3 core biopsies taken on 2020.        A.  3:00 dominant mass  6CM from nipple:  infiltrating ductal carcinoma grade 2 and DCIS                          ER 98%, RI 99% HER-2/cher 1+ negative           B.  3:00 satellite mass 3 cm from nipple  : invasive mucinous carcinoma grade 1         c. Left  breast calcifications DCIS,    5. February 13,2020: [facility] genetic panel negative    6.  Left simple mastectomy: Multifocal 3/6/2020      A.  Invasive ductal carcinoma 2 cm grade 2  [id number]      B.  Invasive mucinous carcinoma grade 1 0.2 cm  Multifocal DCIS  Left axillary lymph node dissection              1 lymph node with macro metastases.0.8--No Extracapsular extension  C..  Right simple mastectomy --sentinel lymph node biopsy negative for disease.      7.Zoladex initiated 4/15/2020      Interval History       Tylenol 3 works for bone pain --her for cycle 3 .  -able to tolerate chemo with MEDS                  Review of Systems   Constitutional:  Sweats, Fatigue, No fever, No chills, No weakness.    Eye:  No recent visual problem.    Ear/Nose/Mouth/Throat:  Nasal congestion, No decreased hearing, No sore throat.    Respiratory:  No shortness of breath, No cough, No sputum production, No hemoptysis, No wheezing.    Cardiovascular:  Negative, No chest pain, No palpitations, No peripheral edema, No syncope.    Breast:  soreness.    Gastrointestinal:  Diarrhea, Heartburn, No nausea, No vomiting, No constipation.    Genitourinary:  last cycle MAY 3, No dysuria, No hematuria.    Hematology/Lymphatics:  No bruising tendency, No bleeding tendency.    Immunologic:  Chemotherapy.    Musculoskeletal:  Joint pain.    Integumentary:  No pruritus.    Neurologic:  Alert and oriented X4, No abnormal balance, No confusion, No numbness, No tingling.    Psychiatric:  No anxiety.       Health Status   Allergies:    Allergic Reactions (Selected)  Severity Not Documented  Emend- Anaphylaxis.  Fosaprepitant- Nausea, flushing and shortness of breath.  Lactose- Stomach upset.  Nickel- Skin reaction irritant.,    Allergies (4) Active Reaction  Emend anaphylaxis  fosaprepitant Shortness of breath  Lactose Stomach upset  Nickel Skin reaction irritant     Current medications:  (Selected)   Outpatient Medications  Future  Zoladex SC implant  - CCA: 3.6 mg, 1 EA, Subcutaneous, Once-chemo, Weeks 13  Zoladex SC implant - CCA: 3.6 mg, 1 EA, Subcutaneous, Once-chemo, Weeks 17  Zoladex SC implant - CCA: 3.6 mg, 1 EA, Subcutaneous, Once-chemo, Weeks 21  Zoladex SC implant - CCA: 3.6 mg, 1 EA, Subcutaneous, Once-chemo, Weeks 9  Documented Medications  Documented  CBD oil: 1 drop(s), SL, Daily, PRN: anxiety, 0 Refill(s)  Decadron 4 mg oral tablet: 4 mg, 1 tab(s), Oral, BID  Zofran ODT 8 mg oral tablet, disintegrating: See Instructions, 8 mg Oral TID for 3 days after chemotherapy and q8hr, PRN: nausea, 30, 2 Refill(s)  acetaminophen-codeine 300 mg-30 mg oral tablet.: 1 tab(s), Oral, TID  escitalopram 10 mg oral tablet: 10 mg, 1 tab(s), Oral, Daily  ibuprofen 200 mg oral tablet: 400 mg, 2 tab(s), Oral, q4hr, PRN: as needed for pain, 120 tab(s), 0 Refill(s)  ondansetron 4 mg oral tablet: 4 mg, 1 tab(s), Oral, q4-6hr  prochlorperazine 5 mg oral tablet: 5 mg, 1 tab(s), Oral, q6hr, PRN: as needed for nausea/vomiting, 30 tab(s), 0 Refill(s)  propranolol 40 mg oral tablet: 40 mg, 1 tab(s), Oral, Daily, PRN: anxiety, 0 Refill(s)   Problem list:    All Problems  Anxiety / 29361406 / Confirmed  Carcinoma of breast / 777234320 / Confirmed  Depression / 897141184 / Confirmed  Hormone imbalance / 9810456772 / Confirmed  History of colonic polyp / 7049021643 / Confirmed  Breast cancer of upper-outer quadrant of left female breast / 119364009 / Confirmed  Review of care plan / 5875942403 / Confirmed  Ringworm / 74965474 / Confirmed  Seasonal allergy / 5570315230 / Confirmed  Resolved: Abnormal weight gain / 2589145043  Resolved: Breast cancer / 008552905  Resolved: Twin birth / 83016244  Resolved: Wellness surveillance / 9310339420  Canceled: No Chronic Problems / NKP,    Active Problems (9)  Anxiety   Breast cancer of upper-outer quadrant of left female breast   Carcinoma of breast   Depression   History of colonic polyp   Hormone imbalance   Review of care plan   Ringworm    Seasonal allergy         Histories   Past Medical History:    Resolved  Wellness surveillance (9934965627):  Resolved.  Abnormal weight gain (8377265443):  Resolved.  Twin birth (39389812):  Resolved.  Comments:  2/27/2020 CST 13:52 EDMUNDO Lopez RN, Stacey FARRELL  has fraternal twin sister  Breast cancer (186706117):  Resolved.   Family History:    Patient was adopted.   Asthma.  Sister  Alcoholism.  Father  Mother  Depression.  Brother  Sister     Procedure history:    Mastectomy Simple (Bilateral) on 3/6/2020 at 29 Years.  Comments:  3/6/2020 12:03 CST - Lejeune RN, Juliano Muniz  auto-populated from documented surgical case  Axillary Node Dissection (Left) on 3/6/2020 at 29 Years.  Comments:  3/6/2020 12:03 CST - Lejeune RN, Doralis Marie  auto-populated from documented surgical case  Biopsy Sentinal Node (Bilateral) on 3/6/2020 at 29 Years.  Comments:  3/6/2020 12:03 CST - Lejeune RN, Doralis Marie  auto-populated from documented surgical case  Mammogram ([id number]) on 3/1/2020 at 29 Years.  Breast biopsy and related procedures ([id number]).  colonoscopy.   Social History        Social & Psychosocial Habits    Alcohol  10/01/2015  Use: Current    Frequency: 1-2 times per month    04/02/2020  Use: Current    Type: Beer, Liquor, Wine    Frequency: 1-2 times per month    Employment/School  10/01/2015  Status: Employed    04/02/2020  Status: Employed    Exercise  10/01/2015  Times per week: 3-4 times/week    Home/Environment  10/01/2015  Living situation: Home/Independent    Nutrition/Health  10/01/2015  Type of diet: Regular    Substance Use  10/01/2015  Use: Never    04/28/2020  Use: Current    Type: Marijuana    Frequency: Daily    Tobacco  10/01/2015  Use: Never smoker    02/27/2020  Use: Never (less than 100 in l    Patient Wants Consult For Cessation Counseling N/A    03/06/2020  Use: Never (less than 100 in l    Patient Wants Consult For Cessation Counseling N/A    03/19/2020  Use: Never (less than 100 in l     Patient Wants Consult For Cessation Counseling No    04/23/2020  Use: Never (less than 100 in l    Patient Wants Consult For Cessation Counseling No    06/03/2020  Use: Never (less than 100 in l    Patient Wants Consult For Cessation Counseling No    Abuse/Neglect  02/18/2020  SHX Any signs of abuse or neglect No    02/27/2020  SHX Any signs of abuse or neglect No    05/14/2020  SHX Any signs of abuse or neglect No    05/28/2020  SHX Any signs of abuse or neglect No    06/03/2020  SHX Any signs of abuse or neglect No    Feels unsafe at home: No    Spiritual/Cultural  02/27/2020  Caodaism Preference None    04/02/2020  [employer] Preference [employer]  .        Physical Examination   Vital Signs   6/3/2020 8:53 CDT        Temperature Oral          37 DegC                             Temperature Oral (calculated)             98.60 DegF                             Peripheral Pulse Rate     78 bpm     Measurements from flowsheet : Measurements   6/3/2020 8:53 CDT        Weight Dosing             65.1 kg                             Weight Measured           65.1 kg                             Weight Measured and Calculated in Lbs     143.52 lb                             Height/Length Dosing      154 cm                             Height/Length Measured    154 cm                             BSA Measured              1.67 m2                             Body Mass Index Measured  27.45 kg/m2     General:  Alert and oriented, No acute distress.    Eye:  Pupils are equal, round and reactive to light, Extraocular movements are intact, Normal conjunctiva.    HENT:  Normocephalic, Oral mucosa is moist, No pharyngeal erythema.    Neck:  Supple, No jugular venous distention, No lymphadenopathy, No thyromegaly.    Respiratory:  Lungs are clear to auscultation, Respirations are non-labored, Breath sounds are equal, Symmetrical chest wall expansion, No chest wall tenderness.    Cardiovascular:  Normal rate, Regular rhythm, No murmur,  No gallop, No edema.    Breast:  No mass, No tenderness, No discharge.    Gastrointestinal:  Soft, Non-tender, Non-distended, Normal bowel sounds, No organomegaly.    Lymphatics:  No lymphadenopathy neck, axilla, groin.    Musculoskeletal:  Normal strength, No tenderness, No swelling, No deformity, Normal gait.    Integumentary:  Warm, Intact, Moist, No pallor, ring worm on bilateral arms.    Neurologic:  Alert, Oriented, Cranial Nerves II-XII are grossly intact.    Cognition and Speech:  Oriented, Speech clear and coherent, Functional cognition intact.    Psychiatric:  Cooperative, Appropriate mood & affect, Normal judgment.    ECOG Performance Scale: 0 - Fully active; no performance restrictions.      Review / Management   Results review:  Lab results   6/3/2020 8:50 CDT        POC Sodium                142 mmol/L                             POC Potassium             3.9 mmol/L                             POC Chloride              105 mmol/L                             POC Ion Calcium           1.24 mmol/L                             POC Glucose               87 mg/dL                             POC BUN                   9.0 mg/dL                             POC Creatinine            0.7 mg/dL                             POC AGAP                  16.0  NA                             POC Hb                    11.2 mg/dL  LOW                             POC Hct                   33.0 %  LOW                             POC TCO2                  25.0 mmol/L    6/3/2020 8:38 CDT        WBC                       6.0 x10(3)/mcL                             RBC                       3.68 x10(6)/mcL  LOW                             Hgb                       11.0 gm/dL  LOW                             Hct                       33.5 %  LOW                             Platelet                  304 x10(3)/mcL                             MCV                       91.0 fL                             MCH                       29.9  pg                             MCHC                      32.8 gm/dL  LOW                             RDW                       13.3 %                             MPV                       9.9 fL                             Abs Neut                  3.49 x10(3)/mcL                             NEUT%                     57.8 %  NA                             NEUT#                     3.5 x10(3)/mcL                             LYMPH%                    29.5 %  NA                             LYMPH#                    1.8 x10(3)/mcL                             MONO%                     10.8 %  NA                             MONO#                     0.6 x10(3)/mcL                             EOS%                      1.0 %  NA                             EOS#                      0.1 x10(3)/mcL                             BASO%                     0.7 %  NA                             BASO#                     0.0 x10(3)/mcL  .    Laboratory Results   Lines and Tubes:  Peripheral catheter.    Response to Treatment:  Complete response.       Impression and Plan   30-year-old premenopausal female status post bilateral mastectomies,  Left breast multifocal disease, pathological staging T1 cN1 M0, grade 2, ER DC positive, HER-2/cher negative,  1 lymph node, 0.8 cm, no extracapsular extension)    Mild capsulitis of the right shoulder    c/p -prob due to Neulasta    Reaction to EMEND  ring worm      PLAN:   Fertility preservation--patient refused.  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  continue physical therapy instructed to go back to physical therapy,   Decadron twice daily x3 days after chemo         Claritin 10 mg x 5 days  Zoladex -continue monthly-This is due next week and continue monthly             Taxotere Cytoxan x4 cycles- UPT day of treatment       Cycle 3 today   no dose changes  Return to clinic cycle 4-day 1 --cbc/cmp day before                 will hold labs in between  d/c picc line on Cycle 4 day 1  d/c Emend  consider Zyprexa if nausea  if bone pain worse -change Neulasta to 4mg on day 2 and not patch        Nilton Pires MD

## 2022-04-30 NOTE — PROGRESS NOTES
Patient:   Radha Yen            MRN: [id number]            FIN: [id number]               Age:   30 years     Sex:  Female     :  1990   Associated Diagnoses:   None   Author:   Nilton Pires MD      Chief Complaint   2020 9:06 CDT       No c.o- right arm pain at this time. Chest discomfort last night .        Visit Information   Referring physician: Dina Caldwell  Diagnosis :       AJCC 8th ed clinical anatomic stage IIA / prognostic stage IA (cT1 cN0 M0) left invasive ductal carcinoma grade 1 ER 98.47 %, OK 99.01%, HER2 cher 0 - negative on IHC.    Final pathology revealed [id number] ed pathological anatomic stage IA / prognostic stage IA (pT1c(mf) pN1a M0) left breast INVASIVE DUCTAL CARCINOMA, MULTIFOCAL (X2)  : 3:00 O'CLOCK 6 CMFN: INVASIVE DUCTAL CARCINOMA, GRADE 2 (2.0 CM)   3:00 O'CLOCK 3 CMFN: INVASIVE MUCINOUS CARCINOMA, GRADE 1 (0.2 CM.),   DUCTAL CARCINOMA IN SITU, LOW GRADE CRIBRIFORM TYPE, MULTIFOCAL (X3)  . INVASIVE CARCINOMA AND DCIS ARE 1.0 CM FROM THE ANTERIOR/SKIN MARGIN.   ONE OF 23 LYMPH NODES POSITIVE FOR METASTATIC CARCINOMA.      Summary:  1. Breast pain nipple discharge November- 2019  2.  Palpable mass 2020  3.  Bilateral diagnostic mammogram and ultrasound 2020: Irregular high density mass 3:00 posterior depth and middle depth with satellite lesion.  1.2 cm from the base of the nipple, extent of calcifications 8 cm.  Ultrasound of the mass at 3 o'clock position 6 cm from nipple 1.7 x 2 x 1.1 cm.  With skin retraction, satellite mass at 3 o'clock position 0.4 x 0.4 x 0.2, no evidence of axillary adenopathy  4.  3 core biopsies taken on 2020.        A.  3:00 dominant mass  6CM from nipple:  infiltrating ductal carcinoma grade 2 and DCIS                          ER 98%, OK 99% HER-2/cher 1+ negative           B.  3:00 satellite mass 3 cm from nipple  : invasive mucinous carcinoma grade 1         c. Left breast calcifications  DCIS,    5. February 13,2020: [facility] genetic panel negative    6.  Left simple mastectomy: Multifocal 3/6/2020      A.  Invasive ductal carcinoma 2 cm grade 2  [id number]      B.  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  Right simple mastectomy --sentinel lymph node biopsy negative for disease.      7.Zoladex initiated 4/15/2020           Interval History   Patient was initially seen by us as a new patient On April 7, 2020.    She underwent echocardiogram with a normal left ventricular systolic function.  She was referred to fertility preservation.  She elected not to go egg retrieval.  She had chemotherapy education she started on Zoladex.  more arm pain in the right arm.  She has difficulty with lifting her shoulder all the way on the right side.  Her numbness and tingling of simply improved with the gabapentin.  She is not sure if she slept funny on her right side.      Review of Systems   Point review of systems completed.  Pertinent positives above in interim history,      Health Status   Allergies:    Allergic Reactions (Selected)  Severity Not Documented  Lactose- Stomach upset.  Nickel- Skin reaction irritant.  No Known Medication Allergies   Current medications:  (Selected)   Inpatient Medications  Future  Aloxi (for IVPB): 0.25 mg, IV Piggyback, Once-chemo, Days 1  Benadryl 50mg/ml Inj: 25 mg, IV Piggyback, Once-chemo, Days 1  Cytoxan (for IVPB): 960 mg, IV Piggyback, Once-chemo, Days 1  Heparin Flush 100 U/mL - 5 mL: 500 units, IV Push, Once-chemo, Days 1  Neulasta Inj. (CCA): 6 mg, Subcutaneous, Once-chemo, Days 2  Taxotere (for IVPB): 120 mg, IV Piggyback, Once-chemo, Days 1  Zantac Injection (for IVPB): 50 mg, IV Piggyback, Once-chemo, Days 1  Zoladex SC implant - CCA: 3.6 mg, 1 EA, Subcutaneous, Once-chemo, Weeks 13  Zoladex SC implant - CCA: 3.6 mg, 1 EA,  Subcutaneous, Once-chemo, Weeks 17  Zoladex SC implant - CCA: 3.6 mg, 1 EA, Subcutaneous, Once-chemo, Weeks 21  Zoladex SC implant - CCA: 3.6 mg, 1 EA, Subcutaneous, Once-chemo, Weeks 5  Zoladex SC implant - CCA: 3.6 mg, 1 EA, Subcutaneous, Once-chemo, Weeks 9  dexamethasone (for IVPB): 10 mg, IV Piggyback, Once-chemo, Days 1  fosaprepitant (for IVPB): 150 mg, IV Piggyback, Once-chemo, Days 1  Prescriptions  Prescribed  dexamethasone 4 mg oral tablet: 8 mg, 2 tab(s), Oral, BID, for 3 day(s), starting day prior to taxotere, 14 tab(s), 12 Refill(s)  gabapentin 100 mg oral capsule: 100 mg, 1 cap(s), Oral, TID, 90 cap(s), 0 Refill(s)  Documented Medications  Documented  CBD oil: 1 drop(s), SL, Daily, PRN: anxiety, 0 Refill(s)  Lexapro 5 mg oral tablet: 10 mg, 2 tab(s), Oral, Daily, 30 tab(s), 0 Refill(s)  Zofran ODT 8 mg oral tablet, disintegrating: See Instructions, 8 mg Oral TID for 3 days after chemotherapy and q8hr, PRN: nausea, 30, 2 Refill(s)  ibuprofen 200 mg oral tablet: 400 mg, 2 tab(s), Oral, q4hr, PRN: as needed for pain, 120 tab(s), 0 Refill(s)  prochlorperazine 5 mg oral tablet: 5 mg, 1 tab(s), Oral, q6hr, PRN: as needed for nausea/vomiting, 30 tab(s), 0 Refill(s)  propranolol 40 mg oral tablet: 40 mg, 1 tab(s), Oral, Daily, PRN: anxiety, 0 Refill(s)      Histories   Family History:    Patient was adopted.   Father  Alcoholism.  Mother  Alcoholism.  Brother  Depression.  Sister  Depression.  Asthma.     Procedure history:    Mastectomy Simple (Bilateral) on 3/6/2020 at 29 Years.  Comments:  3/6/2020 12:03 EDMUNDO - Lejeune RN, Juliano Muniz  auto-populated from documented surgical case  Axillary Node Dissection (Left) on 3/6/2020 at 29 Years.  Comments:  3/6/2020 12:03 CST - Lejeune RN, Juliano Muniz  auto-populated from documented surgical case  Biopsy Sentinal Node (Bilateral) on 3/6/2020 at 29 Years.  Comments:  3/6/2020 12:03 CST - Lejeune RN, Juliano Muniz  auto-populated from documented surgical  case  Mammogram ([id number]) on 3/1/2020 at 29 Years.  Breast biopsy and related procedures ([id number]).  colonoscopy.      Gynecologic history   Menstrual cycle: date of last menstrual period 3/29/2020.   Pregnancy status:  0, para 0.   Social History        Social & Psychosocial Habits    Alcohol  10/01/2015  Use: Current    Frequency: 1-2 times per month    2020  Use: Current    Type: Beer, Liquor, Wine    Frequency: 1-2 times per month    Employment/School  10/01/2015  Status: Employed    2020  Status: Employed    Exercise  10/01/2015  Times per week: 3-4 times/week    Home/Environment  10/01/2015  Living situation: Home/Independent    Nutrition/Health  10/01/2015  Type of diet: Regular    Substance Use  10/01/2015  Use: Never    Tobacco  10/01/2015  Use: Never smoker    2020  Use: Never (less than 100 in l    Patient Wants Consult For Cessation Counseling N/A    2020  Use: Never (less than 100 in l    Patient Wants Consult For Cessation Counseling N/A    2020  Use: Never (less than 100 in l    Patient Wants Consult For Cessation Counseling No    04/15/2020  Use: Never (less than 100 in l    Patient Wants Consult For Cessation Counseling No    Abuse/Neglect  2020  SHX Any signs of abuse or neglect No    2020  SHX Any signs of abuse or neglect No    2020  SHX Any signs of abuse or neglect No    Feels unsafe at home: No    2020  SHX Any signs of abuse or neglect No    04/15/2020  SHX Any signs of abuse or neglect No    Spiritual/Cultural  2020  Rastafari Preference None    2020  Rastafari Preference Hoahaoism  .        Physical Examination   Vital Signs   2020 9:06 CDT       Temperature Oral          37 DegC                             Temperature Oral (calculated)             98.60 DegF                             Peripheral Pulse Rate     66 bpm                             Systolic Blood Pressure   103 mmHg                              Diastolic Blood Pressure  62 mmHg                             Blood Pressure Location   Right arm                             Manual Cuff BP            No     Measurements from flowsheet : Measurements   4/23/2020 9:06 CDT       Weight Dosing             64.4 kg                             Weight Measured           64.4 kg                             Weight Measured and Calculated in Lbs     141.98 lb                             Height/Length Dosing      154 cm                             Height/Length Measured    154 cm                             BSA Measured              1.66 m2                             Body Mass Index Measured  27.15 kg/m2     General:  Alert and oriented.    Eye:  Pupils are equal, round and reactive to light, Extraocular movements are intact, Normal conjunctiva.    HENT:  Normocephalic, Tympanic membranes are clear, Normal hearing, No pharyngeal erythema.    Neck:  Supple, Non-tender, No jugular venous distention, No lymphadenopathy.    Respiratory:  Lungs are clear to auscultation, Respirations are non-labored, Breath sounds are equal, Symmetrical chest wall expansion.    Cardiovascular:  Normal rate, Regular rhythm, No murmur, No gallop, Normal peripheral perfusion.    Breast:  The patient is status post bilateral mastectomies.  She has well-healed surgical scars without any redness or signs of infections.  She has mild edema bilaterally.  And tightness with range of motion.  There is no definitive mass or nodularity.  There are some mild cording in bilateral axillary regions.  More prominent on the left than on the right..         Surgically altered: Bilateral breasts.         Scar(s): Bilateral, Mastectomy.    Gastrointestinal:  Soft, Non-tender, Non-distended, Normal bowel sounds, No organomegaly.    Genitourinary:  No costovertebral angle tenderness.    Lymphatics:  No lymphadenopathy neck, axilla, groin.    Musculoskeletal:  No swelling, No deformity, Normal gait.    The patient has  no clavicular tenderness.  She has no tenderness of the right shoulder.  Or forearm.  She has good  strength and good elbow flexion extension.  She has good passive and active internal and external rotation.  She has difficulty with passive and active abduction.  And flexion above 45 degrees.   Integumentary:  Warm, Intact, Moist, No pallor, No rash.    Neurologic:  Alert, Oriented, Normal sensory, Normal motor function, Cranial Nerves II-XII are grossly intact, Normal deep tendon reflexes.    Cognition and Speech:  Oriented, Speech clear and coherent, Functional cognition intact.    Psychiatric:  Cooperative, Appropriate mood & affect, Normal judgment.    ECOG Performance Scale: 0 - Fully active; no performance restrictions.      Review / Management   Results review:  Last 10 Days Lab Results : Lab View   4/23/2020 9:02 CDT       POC Sodium                137 mmol/L  LOW                             POC Potassium             4.1 mmol/L                             POC Chloride              104 mmol/L                             POC Ion Calcium           1.17 mmol/L                             POC Glucose               95 mg/dL                             POC BUN                   13.0 mg/dL                             POC Creatinine            0.8 mg/dL                             POC AGAP                  16.0  NA                             POC Hb                    12.2 mg/dL                             POC Hct                   36.0 %  LOW                             POC TCO2                  22.0 mmol/L  LOW    4/23/2020 8:48 CDT       WBC                       7.5 x10(3)/mcL                             RBC                       4.05 x10(6)/mcL  LOW                             Hgb                       12.1 gm/dL                             Hct                       36.7 %  LOW                             Platelet                  296 x10(3)/mcL                             MCV                       90.6 fL                              MCH                       29.9 pg                             MCHC                      33.0 gm/dL                             RDW                       12.3 %                             MPV                       10.0 fL                             Abs Neut                  4.93 x10(3)/mcL                             NEUT%                     66.0 %  NA                             NEUT#                     4.9 x10(3)/mcL                             LYMPH%                    21.1 %  NA                             LYMPH#                    1.6 x10(3)/mcL                             MONO%                     10.3 %  NA                             MONO#                     0.8 x10(3)/mcL                             EOS%                      2.1 %  NA                             EOS#                      0.2 x10(3)/mcL                             BASO%                     0.4 %  NA                             BASO#                     0.0 x10(3)/mcL  .    Laboratory Results   Response to Treatment:  Complete response.       Impression and Plan   30-year-old premenopausal female status post bilateral mastectomies,  Left breast multifocal disease, pathological staging T1 cN1 M0, grade 2, ER CA positive, HER-2/cher negative,  1 lymph node, 0.8 cm, no extracapsular extension)    NCCN guidelines discussed  Predict data reviewed .  The patient was given a copy of the predict data.  It shows surgery alone has a 68% survival rate.  The addition of chemotherapy and anti-hormonal therapy improve her overall 15-year survival to 84%.  Adjuvant endocrine therapy--with extended endocrine therapy (avoid estrogen)  The side effects of anti-hormonal therapy were discussed.  Including but not limited to: Vasomotor hot flashes, dysuria, arthralgia, hyperlipidemia, osteopenia/osteoporosis.  medications and patient had the opportunity to ask questions.  Adjuvant chemotherapy      The side effects of chemotherapy  were discussed.  Including but not limited to: Nausea, vomiting, alopecia, neuropathy, neutropenia, blood transfusion, nephropathy, secondary malignancies, congestive heart failure, allergic reactions to name a few.  patient with the opportunity to have questions answered.  COVID risk  Fertility risk     Mild capsulitis of the right shoulder          PLAN:   Fertility preservation--patient refused.  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  She did not go to physical therapy instructed to go back to physical therapy, Decadron twice daily x3 days, Claritin 10 mg x 5 days  Zoladex -continue             Taxotere Cytoxan x4 cycles- UPT day of treatment         Day 8 CBC  Return to clinic cycle 2-day 1 with CBC BMP hepatic function panel        Nilton Pires MD

## 2022-04-30 NOTE — PROGRESS NOTES
Patient:   Radha Yen            MRN: [id number]            FIN: [id number]               Age:   31 years     Sex:  Female     :  1990   Associated Diagnoses:   None   Author:   Nilton Pires MD      Chief Complaint   2021 10:33 CDT       none      Visit Information   Referring physician: Dina Caldwell    Current Treatment: Tamoxifen, 21, started after stopping monthly Zoladex (2020-21) and aromasin (20-10/28/20) due to intolerance             Diagnosis: (cT1 cN1 M0) left invasive ductal carcinoma grade 1 ER 98.47 %, AL 99.01%, HER2 cher 0 - negative on IHC.    Treatment History:  2020: Palpable mass  20:Bilateral diagnostic mammogram and ultrasound-Irregular high density mass 3:00 posterior depth and middle depth with satellite lesion.  1.2 cm from the base of the nipple, extent of calcifications 8 cm.  Ultrasound of the mass at 3 o'clock position 6 cm from nipple 1.7 x 2 x 1.1 cm.  With skin retraction, satellite mass at 3 o'clock position 0.4 x 0.4 x 0.2, no evidence of axillary adenopathy  20: 3 core biopsies   Path-  A.  3:00 dominant mass  6CM from nipple:  infiltrating ductal carcinoma grade 2 and DCIS                           ER 98%, AL 99% HER-2/cher 1+ negative    B.  3:00 satellite mass 3 cm from nipple  : invasive mucinous carcinoma grade 1           C. Left breast calcifications DCIS  20: Negative genetic testing  3/6/2020: Left simple mastectomy: Multifocal        A.  Invasive ductal carcinoma 2 cm grade 2  [id number]       B.  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 Right simple mastectomy --sentinel lymph node biopsy negative for disease.  Final pathology revealed [id number] pathological anatomic stage IA / prognostic stage IA (pT1c(mf) pN1a M0) left breast INVASIVE DUCTAL CARCINOMA, MULTIFOCAL  (X2)   3:00 O'CLOCK 6 CMFN: INVASIVE DUCTAL CARCINOMA, GRADE 2 (2.0 CM   3:00 O'CLOCK 3 CMFN: INVASIVE MUCINOUS CARCINOMA, GRADE 1 (0.2 CM.)   DUCTAL CARCINOMA IN SITU, LOW GRADE CRIBRIFORM TYPE, MULTIFOCAL (X3)   INVASIVE CARCINOMA AND DCIS ARE 1.0 CM FROM THE ANTERIOR/SKIN MARGIN.    ONE OF 23 LYMPH NODES POSITIVE FOR METASTATIC CARCINOMA.  4/15/20: Zoladex  4/23/20-6/24/20: Completed 4 cycles of adjuvant chemotherapy Taxotere/Cytoxan       Interval History   Patient was last seen on January 13, 2021.  The plan at that time was to bring her back for repeat labs and follow-up.  Zoladex was stopped.  She was changed to tamoxifen.  Effexor was increased.  Patient was last seen in January.  She was also have a follow-up with us in February she was seen by the breast clinic in March. She does go to physical therapy and uses compression sleeve on left arm to help Lymphedema. She stopped taking her propranolol and does feel like since stopping she is a little more depressed/anxious. She is unsure about next Gyn appointment, but will schedule. She still has light monthly menstrual cycles.     IKarol LPN, acted solely as a scribe for and in the presence of Dr. Nilton Pires who performed this service.      Review of Systems   A complete 12 point ROS was performed in full with pertinent positives as described in interval history. Remainder of ROS done in full and are negative.    Constitutional:  Negative.    Eye:  Negative.    Respiratory:  No shortness of breath, No wheezing.    Cardiovascular:  No chest pain, No palpitations, No peripheral edema.    Gastrointestinal:  Negative.    Genitourinary:  Negative, Still has occasional menstrual cycles.    Hematology/Lymphatics:  Negative.    Endocrine:  Negative.    Integumentary:  No rash.    Neurologic:  Alert and oriented X4.    Psychiatric:  Anxiety, Depression.       Health Status   Allergies:    Allergic Reactions (Selected)  Severity Not Documented  Emend-  Anaphylaxis.  Fosaprepitant- Nausea, flushing and shortness of breath.  Lactose- Stomach upset.  Nickel- Skin reaction irritant.,    Allergies (4) Active Reaction  Emend anaphylaxis  fosaprepitant Shortness of breath  Lactose Stomach upset  Nickel Skin reaction irritant     Current medications:  (Selected)   Prescriptions  Prescribed  Effexor XR 75 mg oral capsule, extended release: 75 mg = 1 cap(s), Oral, Daily, # 30 cap(s), 5 Refill(s), Pharmacy: [facility], 155, cm, Height/Length Dosing, 01/13/21 9:10:00 CST, 63.8, kg, Weight Dosing, 01/13/21 9:10:00 CST  Xanax 0.5 mg oral tablet: See Instructions, 1 tab(s) Oral BID prn, # 30 tab(s), 0 Refill(s), Pharmacy: [facility], 155, cm, Height/Length Dosing, 01/13/21 9:10:00 CST, 63.8, kg, Weight Dosing, 01/13/21 9:10:00 CST  tamoxifen 20 mg oral tablet: 20 mg = 1 tab(s), Oral, Daily, # 30 tab(s), 6 Refill(s), Pharmacy: [facility], 155, cm, Height/Length Dosing, 01/13/21 9:10:00 CST, 63.8, kg, Weight Dosing, 01/13/21 9:10:00 CST  Documented Medications  Documented  DULoxetine 30 mg oral delayed release capsule: 30 mg = 1 cap(s), Oral, Daily  naproxen 500 mg oral tablet: 500 mg = 1 tab(s), Oral, BID  propranolol 60 mg oral capsule, extended release: 60 mg = 1 cap(s), Oral, Daily  venlafaxine 37.5 mg oral capsule, extended release: 37.5 mg = 1 cap(s), Oral, Daily   Problem list:    All Problems  Anxiety / 60252687 / Confirmed  Carcinoma of breast / 123396550 / Confirmed  Depression / 435949058 / Confirmed  Hormone imbalance / 6243252344 / Confirmed  History of colonic polyp / 2866100141 / Confirmed  Muscle tightness / 913119056 / Confirmed  Lymphedema of arm / 7200318171 / Confirmed  Breast cancer of upper-outer quadrant of left female breast / 071929467 / Confirmed  Review of care plan / 7123152499 / Confirmed  Ringworm / 47957247 / Confirmed  Seasonal allergy / 0303941565 / Confirmed  Resolved: Abnormal weight gain / 4064127186  Resolved: Breast cancer /  523318659  Resolved: Twin birth / 47827948  Resolved: Wellness surveillance / 9258636427  Canceled: No Chronic Problems / NKP,    Active Problems (11)  Anxiety   Breast cancer of upper-outer quadrant of left female breast   Carcinoma of breast   Depression   History of colonic polyp   Hormone imbalance   Lymphedema of arm   Muscle tightness   Review of care plan   Ringworm   Seasonal allergy         Histories   Past Medical History:    Resolved  Wellness surveillance (0544461621):  Resolved.  Abnormal weight gain (8968580070):  Resolved.  Twin birth (51529992):  Resolved.  Comments:  2/27/2020 CST 13:52 EDMUNDO Lopez RN, Stacey FARRELL  has fraternal twin sister  Breast cancer (939702168):  Resolved.   Family History:    Patient was adopted.   Asthma.  Sister  Alcoholism.  Father  Mother  Depression.  Brother  Sister     Procedure history:    Mastectomy Simple (Bilateral) on 3/6/2020 at 29 Years.  Comments:  3/6/2020 12:03 CST - Lejeune RN, Doralis Marie  auto-populated from documented surgical case  Axillary Node Dissection (Left) on 3/6/2020 at 29 Years.  Comments:  3/6/2020 12:03 CST - Lejeune RN, Doralis Marie  auto-populated from documented surgical case  Biopsy Sentinal Node (Bilateral) on 3/6/2020 at 29 Years.  Comments:  3/6/2020 12:03 CST - Lejeune RN, Doralis Marie  auto-populated from documented surgical case  Mammogram ([id number]) on 3/1/2020 at 29 Years.  Breast biopsy and related procedures ([id number]).  colonoscopy.   Social History        Social & Psychosocial Habits    Alcohol  10/01/2015  Use: Current    Frequency: 1-2 times per month    04/02/2020  Use: Current    Type: Beer, Liquor, Wine    Frequency: 1-2 times per month    Employment/School  10/01/2015  Status: Employed    04/02/2020  Status: Employed    Exercise  10/01/2015  Times per week: 3-4 times/week    Home/Environment  10/01/2015  Living situation: Home/Independent    Nutrition/Health  10/01/2015  Type of diet: Regular    Substance  Use  10/01/2015  Use: Never    04/28/2020  Use: Current    Type: Marijuana    Frequency: Daily    Tobacco  12/23/2020  Use: Never (less than 100 in l    Patient Wants Consult For Cessation Counseling No    01/13/2021  Use: Never (less than 100 in l    Patient Wants Consult For Cessation Counseling No    Abuse/Neglect  12/23/2020  SHX Any signs of abuse or neglect No    Feels unsafe at home: No    Safe place to go: Yes    01/13/2021  SHX Any signs of abuse or neglect No    Feels unsafe at home: No    Spiritual/Cultural  02/27/2020  Judaism Preference None    04/02/2020  Judaism Preference Hinduism  .        Physical Examination   Vital Signs   6/2/2021 10:29 CDT       Temperature Oral          37.1 DegC                             Temperature Oral (calculated)             98.78 DegF                             Peripheral Pulse Rate     95 bpm                             Respiratory Rate          17 br/min                             SpO2                      97 %                             Oxygen Therapy            Room air                             Systolic Blood Pressure   120 mmHg                             Diastolic Blood Pressure  76 mmHg                             Blood Pressure Location   Right arm                             Manual Cuff BP            No                             O2 SAT at rest            97 %     Measurements from flowsheet : Measurements   6/2/2021 10:29 CDT       Weight Dosing             68.900 kg                             Weight Measured           68.9 kg                             Weight Measured and Calculated in Lbs     151.90 lb                             Height/Length Dosing      155.00 cm                             Height/Length Measured    155 cm                             BSA Measured              1.72 m2                             Body Mass Index Measured  28.68 kg/m2     General:  Alert and oriented, No acute distress.    Eye:  Pupils are equal, round and  reactive to light, Normal conjunctiva, Vision unchanged.    HENT:  Normocephalic, Normal hearing, Oral mucosa is moist, No pharyngeal erythema.    Neck:  Supple, Non-tender, No lymphadenopathy.    Respiratory:  Lungs are clear to auscultation, Respirations are non-labored, No chest wall tenderness.    Cardiovascular:  Normal rate, Regular rhythm, No edema.    Breast:  No masses on bilateral chest wall.    Gastrointestinal:  Soft, Non-tender, Non-distended, Normal bowel sounds, No organomegaly.    Genitourinary:  No costovertebral angle tenderness.    Lymphatics:  No lymphadenopathy neck, axilla, groin.    Musculoskeletal:  Normal range of motion, Normal strength.         Upper extremity exam: Arm ( Left, No swelling, No tenderness ).    Integumentary:  Warm, Dry, No rash.    Neurologic:  Alert, Oriented, Normal sensory, Normal motor function, No focal deficits, Cranial Nerves II-XII are grossly intact.    Cognition and Speech:  Oriented, Speech clear and coherent.    Psychiatric:  Cooperative, Appropriate mood & affect.    ECOG Performance Scale: 0 - Fully active; no performance restrictions.      Review / Management   Results review:  Lab results   6/2/2021 10:41 CDT       Est Creat Clearance Ser   86.36 mL/min    6/2/2021 10:39 CDT       POC TCO2                  22.0 mmol/L  LOW                             POC Hb                    12.2 mg/dL                             POC Hct                   36.0 %  LOW                             POC Sodium                140 mmol/L                             POC Potassium             4.3 mmol/L                             POC Chloride              106 mmol/L                             POC Ion Calcium           1.23 mmol/L                             POC Glucose               91 mg/dL                             POC BUN                   12.0 mg/dL                             POC Creatinine            0.7 mg/dL                             POC AGAP                  17.0  NA     6/2/2021 10:22 CDT       WBC                       5.6 x10(3)/mcL                             RBC                       4.02 x10(6)/mcL  LOW                             Hgb                       11.8 gm/dL  LOW                             Hct                       35.4 %  LOW                             Platelet                  304 x10(3)/mcL                             MCV                       88.1 fL                             MCH                       29.4 pg                             MCHC                      33.3 gm/dL                             RDW                       12.4 %                             MPV                       10.1 fL                             Abs Neut                  3.39 x10(3)/mcL                             NEUT%                     61.0 %  NA                             NEUT#                     3.4 x10(3)/mcL                             LYMPH%                    28.4 %  NA                             LYMPH#                    1.6 x10(3)/mcL                             MONO%                     7.9 %  NA                             MONO#                     0.4 x10(3)/mcL                             EOS%                      2.3 %  NA                             EOS#                      0.1 x10(3)/mcL                             BASO%                     0.4 %  NA                             BASO#                     0.0 x10(3)/mcL  .    Response to Treatment:  Complete response.       Impression and Plan   IMPRESSION:  1. Left breast multifocal disease, pathological staging T1 cN1 M0, grade 2, ER MS positive, HER-2/cher negative   Completed adjuvant chemotherapy with  TC on 6/24/20.     2. Early Lymphedema left arm, mild swelling and tenderness to forearm    3. Anxiety and Depression        PLAN:    tamoxifen--- we will continue this patient started hormonal blockade in April 2020.  She can continue till April 2025 and then rediscussed extended therapy based on her  age    - Effexor to 75mg. Re-start Propranolol- use as directed-                Much rather her resume her propranolol with the Effexor that increase the Effexor at this point.  It is okay for her primary care to increase the dose if needed if her symptoms are not controlled on their follow-up    -Bilateral mastectomies so no mammogram needed    -Continue calcium and vitamin D    - 9/10/20: Baseline bone density-normal. Repeat 2 years 9/2022    -She was reinstructed to avoid pregnancy for the next 2 years.    (If she is intolerant to single agent Zoladex and will have to discontinue understanding her decrease risk of fertility preservation and changed to tamoxifen alone.      -RTC in 6 months with cmp cbc, iron profile, b12, folate, ferritin  Continue yearly eye exams  Continue yearly GYN follow-up     Nilton Pires MD

## 2022-04-30 NOTE — PROGRESS NOTES
Patient:   Radha Yen            MRN: [id number]            FIN: [id number]               Age:   30 years     Sex:  Female     :  1990   Associated Diagnoses:   None   Author:   Guzman BANKS, Cindy PAYAN      Patient called to report significant generalized fluid retention following cycle 3 TC.  Explained etiology to patient.  She otherwise feels well.  Encouraged fluids, avoid excess salt.  Elevated legs when able.  Script for Lasix 20 mg 2-3 times per week sent to [email or web address].  All questions answered.    RADHA CARNEY,FNP-C

## 2022-04-30 NOTE — PROGRESS NOTES
Patient:   Radha Yen            MRN: [id number]            FIN: [id number]               Age:   30 years     Sex:  Female     :  1990   Associated Diagnoses:   None   Author:   Nilton Pires MD      Chief Complaint   2020 10:10 CDT      [facility] paperwork-(emailing it to me and I will print it)        Visit Information   Referring physician: Dina Caldwell  Current Treatment: Taxotere/Cytoxan 2020  Diagnosis :       AJCC 8th ed clinical anatomic stage IIA / prognostic stage IA (cT1 cN0 M0) left invasive ductal carcinoma grade 1 ER 98.47 %, ID 99.01%, HER2 cher 0 - negative on IHC.    Final pathology revealed [id number] ed pathological anatomic stage IA / prognostic stage IA (pT1c(mf) pN1a M0) left breast INVASIVE DUCTAL CARCINOMA, MULTIFOCAL (X2)  : 3:00 O'CLOCK 6 CMFN: INVASIVE DUCTAL CARCINOMA, GRADE 2 (2.0 CM)   3:00 O'CLOCK 3 CMFN: INVASIVE MUCINOUS CARCINOMA, GRADE 1 (0.2 CM.),   DUCTAL CARCINOMA IN SITU, LOW GRADE CRIBRIFORM TYPE, MULTIFOCAL (X3)  . INVASIVE CARCINOMA AND DCIS ARE 1.0 CM FROM THE ANTERIOR/SKIN MARGIN.   ONE OF 23 LYMPH NODES POSITIVE FOR METASTATIC CARCINOMA.      Summary:  1. Breast pain nipple discharge November- 2019  2.  Palpable mass 2020  3.  Bilateral diagnostic mammogram and ultrasound 2020: Irregular high density mass 3:00 posterior depth and middle depth with satellite lesion.  1.2 cm from the base of the nipple, extent of calcifications 8 cm.  Ultrasound of the mass at 3 o'clock position 6 cm from nipple 1.7 x 2 x 1.1 cm.  With skin retraction, satellite mass at 3 o'clock position 0.4 x 0.4 x 0.2, no evidence of axillary adenopathy  4.  3 core biopsies taken on 2020.        A.  3:00 dominant mass  6CM from nipple:  infiltrating ductal carcinoma grade 2 and DCIS                          ER 98%, ID 99% HER-2/cher 1+ negative           B.  3:00 satellite mass 3 cm from nipple  : invasive mucinous carcinoma grade 1          c. Left breast calcifications DCIS,    5. February 13,2020: [facility] genetic panel negative    6.  Left simple mastectomy: Multifocal 3/6/2020      A.  Invasive ductal carcinoma 2 cm grade 2  [id number]      B.  Invasive mucinous carcinoma grade 1 0.2 cm  Multifocal DCIS  Left axillary lymph node dissection              1 lymph node with macro metastases.0.8--No Extracapsular extension  C..  Right simple mastectomy --sentinel lymph node biopsy negative for disease.      7.Zoladex initiated 4/15/2020      Interval History     Last seen on 4/23/2020.  She received her first cycle of chemotherapy on that day.  She having increasing chest pain went to the emergency room on 4/28 there was no evidence of pulmonary emboli.  Was a right-sided PICC line with the tip in the distal SVC.  Bilateral lungs are unremarkable.  She was prescribed Tylenol 3 and discharged home.  Taxotere Cytoxan on 4/23/2020                  Review of Systems   Constitutional:  Weakness, Fatigue, No fever, No chills.    Eye:  No recent visual problem.    Ear/Nose/Mouth/Throat:  Nasal congestion, No decreased hearing, No sore throat.    Respiratory:  No shortness of breath, No cough, No sputum production, No hemoptysis, No wheezing.    Cardiovascular:  Negative, No chest pain, No palpitations, No peripheral edema, No syncope.    Breast:  soreness.    Gastrointestinal:  Diarrhea, Heartburn, No nausea, No vomiting, No constipation.    Genitourinary:  last cycle MAY 3, No dysuria, No hematuria.    Hematology/Lymphatics:  No bruising tendency, No bleeding tendency.    Immunologic:  Chemotherapy.    Musculoskeletal:  Joint pain.    Integumentary:  No rash, No pruritus.    Neurologic:  Alert and oriented X4, Numbness, Tingling, No abnormal balance, No confusion.       Health Status   Allergies:    Allergic Reactions (Selected)  Severity Not Documented  Emend- Anaphylaxis.  Fosaprepitant- Nausea, flushing and shortness of breath.  Lactose- Stomach  upset.  Nickel- Skin reaction irritant.,    Allergies (4) Active Reaction  Emend anaphylaxis  fosaprepitant Shortness of breath  Lactose Stomach upset  Nickel Skin reaction irritant     Current medications:  (Selected)   Inpatient Medications  Future  Zoladex SC implant - CCA: 3.6 mg, 1 EA, Subcutaneous, Once-chemo, Weeks 13  Zoladex SC implant - CCA: 3.6 mg, 1 EA, Subcutaneous, Once-chemo, Weeks 17  Zoladex SC implant - CCA: 3.6 mg, 1 EA, Subcutaneous, Once-chemo, Weeks 21  Zoladex SC implant - CCA: 3.6 mg, 1 EA, Subcutaneous, Once-chemo, Weeks 5  Zoladex SC implant - CCA: 3.6 mg, 1 EA, Subcutaneous, Once-chemo, Weeks 9  Prescriptions  Prescribed  dexamethasone 4 mg oral tablet: 8 mg, 2 tab(s), Oral, BID, for 3 day(s), starting day prior to taxotere, 14 tab(s), 12 Refill(s)  gabapentin 100 mg oral capsule: 100 mg, 1 cap(s), Oral, TID, 90 cap(s), 0 Refill(s)  Documented Medications  Documented  CBD oil: 1 drop(s), SL, Daily, PRN: anxiety, 0 Refill(s)  Zofran ODT 8 mg oral tablet, disintegrating: See Instructions, 8 mg Oral TID for 3 days after chemotherapy and q8hr, PRN: nausea, 30, 2 Refill(s)  clonazePAM 0.5 mg oral tablet: 0.5 mg, 0.5-1 tab(s), Oral, BID  escitalopram 10 mg oral tablet: 10 mg, 1 tab(s), Oral, Daily  ibuprofen 200 mg oral tablet: 400 mg, 2 tab(s), Oral, q4hr, PRN: as needed for pain, 120 tab(s), 0 Refill(s)  prochlorperazine 5 mg oral tablet: 5 mg, 1 tab(s), Oral, q6hr, PRN: as needed for nausea/vomiting, 30 tab(s), 0 Refill(s)  propranolol 40 mg oral tablet: 40 mg, 1 tab(s), Oral, Daily, PRN: anxiety, 0 Refill(s)   Problem list:    All Problems  Anxiety / 67719010 / Confirmed  Carcinoma of breast / 203632007 / Confirmed  Depression / 256233584 / Confirmed  Hormone imbalance / 2967384082 / Confirmed  History of colonic polyp / 9633168168 / Confirmed  Review of care plan / 0169732280 / Confirmed  Ringworm / 78164662 / Confirmed  Seasonal allergy / 5891587336 / Confirmed  Resolved: Abnormal weight  gain / 9323148088  Resolved: Breast cancer / 683568203  Resolved: Twin birth / 58231781  Resolved: Wellness surveillance / 0560976936  Canceled: No Chronic Problems / NKP,    Active Problems (8)  Anxiety   Carcinoma of breast   Depression   History of colonic polyp   Hormone imbalance   Review of care plan   Ringworm   Seasonal allergy         Histories   Past Medical History:    Resolved  Wellness surveillance (8411292465):  Resolved.  Abnormal weight gain (4081419949):  Resolved.  Twin birth (87864714):  Resolved.  Comments:  2/27/2020 CST 13:52 Stacey Montes De Oca RN  has fraternal twin sister  Breast cancer ([id number]):  Resolved.   Family History:    Patient was adopted.   Asthma.  Sister  Alcoholism.  Father  Mother  Depression.  Brother  Sister     Procedure history:    Mastectomy Simple (Bilateral) on 3/6/2020 at 29 Years.  Comments:  3/6/2020 12:03 CST - Lejeune RN, Doralis Marie  auto-populated from documented surgical case  Axillary Node Dissection (Left) on 3/6/2020 at 29 Years.  Comments:  3/6/2020 12:03 CST - Lejeune RN, Doralis Marie  auto-populated from documented surgical case  Biopsy Sentinal Node (Bilateral) on 3/6/2020 at 29 Years.  Comments:  3/6/2020 12:03 CST - Lejeune RN, Doralis Marie  auto-populated from documented surgical case  Mammogram ([id number]) on 3/1/2020 at 29 Years.  Breast biopsy and related procedures ([id number]).  colonoscopy.   Social History        Social & Psychosocial Habits    Alcohol  10/01/2015  Use: Current    Frequency: 1-2 times per month    04/02/2020  Use: Current    Type: Beer, Liquor, Wine    Frequency: 1-2 times per month    Employment/School  10/01/2015  Status: Employed    04/02/2020  Status: Employed    Exercise  10/01/2015  Times per week: 3-4 times/week    Home/Environment  10/01/2015  Living situation: Home/Independent    Nutrition/Health  10/01/2015  Type of diet: Regular    Substance Use  10/01/2015  Use: Never    04/28/2020  Use: Current    Type:  Marijuana    Frequency: Daily    Tobacco  10/01/2015  Use: Never smoker    02/27/2020  Use: Never (less than 100 in l    Patient Wants Consult For Cessation Counseling N/A    03/06/2020  Use: Never (less than 100 in l    Patient Wants Consult For Cessation Counseling N/A    03/19/2020  Use: Never (less than 100 in l    Patient Wants Consult For Cessation Counseling No    04/23/2020  Use: Never (less than 100 in l    Patient Wants Consult For Cessation Counseling No    04/30/2020  Use: Never (less than 100 in l    Patient Wants Consult For Cessation Counseling No    Abuse/Neglect  02/18/2020  SHX Any signs of abuse or neglect No    02/27/2020  SHX Any signs of abuse or neglect No    03/19/2020  SHX Any signs of abuse or neglect No    Feels unsafe at home: No    04/23/2020  SHX Any signs of abuse or neglect No    04/30/2020  SHX Any signs of abuse or neglect No    Spiritual/Cultural  02/27/2020  Religion Preference None    04/02/2020  Religion Preference Quaker  .        Physical Examination   Vital Signs   5/14/2020 10:10 CDT      Temperature Oral          36.9 DegC                             Temperature Oral (calculated)             98.42 DegF                             Peripheral Pulse Rate     79 bpm                             Systolic Blood Pressure   136 mmHg                             Diastolic Blood Pressure  80 mmHg                             Blood Pressure Location   Right leg                             Manual Cuff BP            No     Measurements from flowsheet : Measurements   5/14/2020 10:10 CDT      Weight Dosing             64.5 kg                             Weight Measured           64.5 kg                             Weight Measured and Calculated in Lbs     142.20 lb                             Height/Length Dosing      154 cm                             Height/Length Measured    154 cm                             BSA Measured              1.66 m2                             Body Mass  Index Measured  27.2 kg/m2     General:  Alert and oriented, No acute distress.    Eye:  Pupils are equal, round and reactive to light, Extraocular movements are intact, Normal conjunctiva.    HENT:  Normocephalic, Oral mucosa is moist, No pharyngeal erythema.    Neck:  Supple, No jugular venous distention, No lymphadenopathy, No thyromegaly.    Respiratory:  Lungs are clear to auscultation, Respirations are non-labored, Breath sounds are equal, Symmetrical chest wall expansion, No chest wall tenderness.    Cardiovascular:  Normal rate, Regular rhythm, No murmur, No gallop, No edema.    Breast:  No mass, No tenderness, No discharge.    Gastrointestinal:  Soft, Non-tender, Non-distended, Normal bowel sounds, No organomegaly.    Lymphatics:  No lymphadenopathy neck, axilla, groin.    Musculoskeletal:  Normal strength, No tenderness, No swelling, No deformity, Normal gait.    Integumentary:  Warm, Intact, Moist, No pallor, No rash.    Neurologic:  Alert, Oriented, Cranial Nerves II-XII are grossly intact.    Cognition and Speech:  Oriented, Speech clear and coherent, Functional cognition intact.    Psychiatric:  Cooperative, Appropriate mood & affect, Normal judgment.    ECOG Performance Scale: 0 - Fully active; no performance restrictions.      Review / Management   Laboratory Results   Today's Lab Results : PowerNote Discrete Results   5/14/2020 10:09 CDT      POC TCO2                  21.0 mmol/L  LOW                             POC Hb                    11.9 mg/dL  LOW                             POC Hct                   35.0 %  LOW                             POC Sodium                138 mmol/L                             POC Potassium             4.1 mmol/L                             POC Chloride              108 mmol/L                             POC Ion Calcium           1.11 mmol/L  LOW                             POC Glucose               93 mg/dL                             POC BUN                   13.0  mg/dL                             POC Creatinine            0.7 mg/dL                             POC AGAP                  14.0  NA    5/14/2020 9:55 CDT       WBC                       6.4 x10(3)/mcL                             RBC                       3.86 x10(6)/mcL  LOW                             Hgb                       11.6 gm/dL  LOW                             Hct                       34.9 %  LOW                             Platelet                  403 x10(3)/mcL  HI                             MCV                       90.4 fL                             MCH                       30.1 pg                             MCHC                      33.2 gm/dL                             RDW                       13.0 %                             MPV                       9.9 fL                             Abs Neut                  4.29 x10(3)/mcL                             NEUT%                     67.1 %  NA                             NEUT#                     4.3 x10(3)/mcL                             LYMPH%                    21.1 %  NA                             LYMPH#                    1.4 x10(3)/mcL                             MONO%                     10.5 %  NA                             MONO#                     0.7 x10(3)/mcL                             EOS%                      0.5 %  NA                             EOS#                      0.0 x10(3)/mcL                             BASO%                     0.5 %  NA                             BASO#                     0.0 x10(3)/mcL        Lines and Tubes:  Peripheral catheter.    Response to Treatment:  Complete response.       Impression and Plan   30-year-old premenopausal female status post bilateral mastectomies,  Left breast multifocal disease, pathological staging T1 cN1 M0, grade 2, ER AR positive, HER-2/cher negative,  1 lymph node, 0.8 cm, no extracapsular extension)    Mild capsulitis of the right shoulder    c/p -prob due to  Neulasta    Reaction to EMEND      PLAN:   Fertility preservation--patient refused.  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  She did not go to physical therapy instructed to go back to physical therapy,   Decadron twice daily x3 days after chemo         Claritin 10 mg x 5 days  Zoladex -continue monthly             Taxotere Cytoxan x4 cycles- UPT day of treatment       Cycle two today  no dose changes  Return to clinic cycle 3-day 1 --cbc/cmp day before                 will hold labs in between  d/c Emend  consider Zyprexa if nausea        Nilton Pires MD

## 2022-04-30 NOTE — PROGRESS NOTES
Patient:   Radha Yen            MRN: [id number]            FIN: [id number]               Age:   30 years     Sex:  Female     :  1990   Associated Diagnoses:   None   Author:   Nilton Pires MD      Chief Complaint   Here for f/u      Visit Information   Referring physician: Dina Caldwell  Current Treatment: Taxotere/Cytoxan 2020  Diagnosis :       AJCC 8th ed clinical anatomic stage IIA / prognostic stage IA (cT1 cN0 M0) left invasive ductal carcinoma grade 1 ER 98.47 %, SD 99.01%, HER2 cher 0 - negative on IHC.    Final pathology revealed [id number] ed pathological anatomic stage IA / prognostic stage IA (pT1c(mf) pN1a M0) left breast INVASIVE DUCTAL CARCINOMA, MULTIFOCAL (X2)  : 3:00 O'CLOCK 6 CMFN: INVASIVE DUCTAL CARCINOMA, GRADE 2 (2.0 CM)   3:00 O'CLOCK 3 CMFN: INVASIVE MUCINOUS CARCINOMA, GRADE 1 (0.2 CM.),   DUCTAL CARCINOMA IN SITU, LOW GRADE CRIBRIFORM TYPE, MULTIFOCAL (X3)  . INVASIVE CARCINOMA AND DCIS ARE 1.0 CM FROM THE ANTERIOR/SKIN MARGIN.   ONE OF 23 LYMPH NODES POSITIVE FOR METASTATIC CARCINOMA.      Summary:  1. Breast pain nipple discharge November- 2019  2.  Palpable mass 2020  3.  Bilateral diagnostic mammogram and ultrasound 2020: Irregular high density mass 3:00 posterior depth and middle depth with satellite lesion.  1.2 cm from the base of the nipple, extent of calcifications 8 cm.  Ultrasound of the mass at 3 o'clock position 6 cm from nipple 1.7 x 2 x 1.1 cm.  With skin retraction, satellite mass at 3 o'clock position 0.4 x 0.4 x 0.2, no evidence of axillary adenopathy  4.  3 core biopsies taken on 2020.        A.  3:00 dominant mass  6CM from nipple:  infiltrating ductal carcinoma grade 2 and DCIS                          ER 98%, SD 99% HER-2/cher 1+ negative           B.  3:00 satellite mass 3 cm from nipple  : invasive mucinous carcinoma grade 1         c. Left breast calcifications DCIS,    5. :  [facility] genetic panel negative    6.  Left simple mastectomy: Multifocal 3/6/2020      A.  Invasive ductal carcinoma 2 cm grade 2  [id number]      B.  Invasive mucinous carcinoma grade 1 0.2 cm  Multifocal DCIS  Left axillary lymph node dissection              1 lymph node with macro metastases.0.8--No Extracapsular extension  C..  Right simple mastectomy --sentinel lymph node biopsy negative for disease.      7.Zoladex initiated 4/15/2020      Interval History       For cycle 3 of chemotherapy the patient had some significant swelling.  She called 1 of her nurse practitioners they put her on Lasix for a few days with significantly helped.  She is not more sure why she had the significant edema.  She had no rash or other associated features.  Here for follow-up          Review of Systems   Constitutional:  Sweats, Fatigue, No fever, No chills, No weakness.    Eye:  No recent visual problem.    Ear/Nose/Mouth/Throat:  Nasal congestion, No decreased hearing, No sore throat.    Respiratory:  No shortness of breath, No cough, No sputum production, No hemoptysis, No wheezing.    Cardiovascular:  Negative, No chest pain, No palpitations, No peripheral edema, No syncope.    Breast:  soreness.    Gastrointestinal:  Diarrhea, Heartburn, No nausea, No vomiting, No constipation.    Genitourinary:  last cycle MAY 3, No dysuria, No hematuria.    Hematology/Lymphatics:  No bruising tendency, No bleeding tendency.    Immunologic:  Chemotherapy.    Musculoskeletal:  Joint pain.    Integumentary:  No pruritus.    Neurologic:  Alert and oriented X4, No abnormal balance, No confusion, No numbness, No tingling.    Psychiatric:  No anxiety.       Health Status   Allergies:    Allergic Reactions (Selected)  Severity Not Documented  Emend- Anaphylaxis.  Fosaprepitant- Nausea, flushing and shortness of breath.  Lactose- Stomach upset.  Nickel- Skin reaction irritant.,    Allergies (4) Active Reaction  Emend anaphylaxis  fosaprepitant Shortness  of breath  Lactose Stomach upset  Nickel Skin reaction irritant     Current medications:  (Selected)   Outpatient Medications  Future  Aloxi (for IVPB): 0.25 mg, 5 mL, 150 mL/hr, IV Piggyback, Once-chemo, Days 1  Benadryl 50mg/ml Inj: 25 mg, 0.5 mL, IV Piggyback, Once-chemo, Days 1  Cytoxan (for IVPB): 980 mg, 1.96 EA, 250 mL/hr, IV Piggyback, Once-chemo, Days 1  Heparin Flush 100 U/mL - 5 mL: 500 units, 5 mL, IV Push, Once-chemo, Days 1  Neulasta Inj. (CCA): 6 mg, 0.6 mL, Subcutaneous, Once-chemo, Days 1  Neulasta Inj. ([facility]): 6 mg, 0.6 mL, Subcutaneous, Once-chemo, Days 2  Neulasta Onpro Kit: 6 mg, 0.6 mL, Subcutaneous, Once-chemo, Days 2  Taxotere (for IVPB): 122 mg, 12.2 mL, 262.2 mL/hr, IV Piggyback, Once-chemo, Days 1  Zantac Injection (for IVPB): 20 mg, 50 mL, 150 mL/hr, IV Piggyback, Once-chemo, Days 1  Zoladex SC implant - CCA: 3.6 mg, 1 EA, Subcutaneous, Once-chemo, Weeks 13  Zoladex SC implant - CCA: 3.6 mg, 1 EA, Subcutaneous, Once-chemo, Weeks 17  Zoladex SC implant - CCA: 3.6 mg, 1 EA, Subcutaneous, Once-chemo, Weeks 21  dexamethasone (for IVPB): 6 mg, IV Piggyback, Once-chemo, Days 1  Prescriptions  Prescribed  Lasix 20 mg oral tablet: 20 mg, 1 tab(s), Oral, Every other day, 15 tab(s), 1 Refill(s)  Zofran ODT 8 mg oral tablet, disintegrating: See Instructions, 8 mg Oral TID for 3 days after chemotherapy and q8hr, PRN: nausea, 30 tab(s), 0 Refill(s)  acetaminophen-codeine 300 mg-30 mg oral tablet.: 1 tab(s), Oral, q8hr, more the 7 day --, PRN: as needed for pain, 20 tab(s), 0 Refill(s)  Documented Medications  Documented  CBD oil: 1 drop(s), SL, Daily, PRN: anxiety, 0 Refill(s)  Decadron 4 mg oral tablet: 4 mg, 1 tab(s), Oral, BID  escitalopram 10 mg oral tablet: 10 mg, 1 tab(s), Oral, Daily  ibuprofen 200 mg oral tablet: 400 mg, 2 tab(s), Oral, q4hr, PRN: as needed for pain, 120 tab(s), 0 Refill(s)  ondansetron 4 mg oral tablet: 4 mg, 1 tab(s), Oral, q4-6hr  prochlorperazine 5 mg oral tablet: 5 mg, 1  tab(s), Oral, q6hr, PRN: as needed for nausea/vomiting, 30 tab(s), 0 Refill(s)  propranolol 40 mg oral tablet: 40 mg, 1 tab(s), Oral, Daily, PRN: anxiety, 0 Refill(s)   Problem list:    All Problems  Anxiety / 97527002 / Confirmed  Carcinoma of breast / 817748422 / Confirmed  Depression / 410264006 / Confirmed  Hormone imbalance / 2166232544 / Confirmed  History of colonic polyp / 6558661477 / Confirmed  Breast cancer of upper-outer quadrant of left female breast / 875727147 / Confirmed  Review of care plan / 7696347591 / Confirmed  Ringworm / 13133434 / Confirmed  Seasonal allergy / 3909093288 / Confirmed  Resolved: Abnormal weight gain / 1254852032  Resolved: Breast cancer / 785923024  Resolved: Twin birth / 61313071  Resolved: Wellness surveillance / 4952031952  Canceled: No Chronic Problems / NKP,    Active Problems (9)  Anxiety   Breast cancer of upper-outer quadrant of left female breast   Carcinoma of breast   Depression   History of colonic polyp   Hormone imbalance   Review of care plan   Ringworm   Seasonal allergy         Histories   Past Medical History:    Resolved  Wellness surveillance ([id number]):  Resolved.  Abnormal weight gain (5324263909):  Resolved.  Twin birth (27677935):  Resolved.  Comments:  2/27/2020 [facility] 13:52 Stacey Montes De Oca RN  has fraternal twin sister  Breast cancer ([id number]):  Resolved.   Family History:    Patient was adopted.   Asthma.  Sister  Alcoholism.  Father  Mother  Depression.  Brother  Sister     Procedure history:    Mastectomy Simple (Bilateral) on 3/6/2020 at 29 Years.  Comments:  3/6/2020 12:03 CST - Lejeune RN, Doralis Marie  auto-populated from documented surgical case  Axillary Node Dissection (Left) on 3/6/2020 at 29 Years.  Comments:  3/6/2020 12:03 CST - Lejeune RN, Doralis Marie  auto-populated from documented surgical case  Biopsy Sentinal Node (Bilateral) on 3/6/2020 at 29 Years.  Comments:  3/6/2020 12:03 CST - Lejeune RN, Doralis  Cristy  auto-populated from documented surgical case  Mammogram ([id number]) on 3/1/2020 at 29 Years.  Breast biopsy and related procedures ([id number]).  colonoscopy.   Social History        Social & Psychosocial Habits    Alcohol  10/01/2015  Use: Current    Frequency: 1-2 times per month    04/02/2020  Use: Current    Type: Beer, Liquor, Wine    Frequency: 1-2 times per month    Employment/School  10/01/2015  Status: Employed    04/02/2020  Status: Employed    Exercise  10/01/2015  Times per week: 3-4 times/week    Home/Environment  10/01/2015  Living situation: Home/Independent    Nutrition/Health  10/01/2015  Type of diet: Regular    Substance Use  10/01/2015  Use: Never    04/28/2020  Use: Current    Type: Marijuana    Frequency: Daily    Tobacco  06/24/2020  Use: Never (less than 100 in l    Patient Wants Consult For Cessation Counseling No    Abuse/Neglect  06/24/2020  SHX Any signs of abuse or neglect No    Spiritual/Cultural  02/27/2020  Sikhism Preference None    04/02/2020  Sikhism Preference Taoism  .        Physical Examination   Vital Signs   6/24/2020 8:53 CDT       Temperature Oral          36.8 DegC                             Temperature Oral (calculated)             98.24 DegF                             Peripheral Pulse Rate     70 bpm                             Systolic Blood Pressure   138 mmHg                             Diastolic Blood Pressure  81 mmHg                             Blood Pressure Location   Right leg                             Manual Cuff BP            No     Measurements from flowsheet : Measurements   6/24/2020 8:53 CDT       Weight Dosing             66.8 kg                             Weight Measured           66.8 kg                             Weight Measured and Calculated in Lbs     147.27 lb                             Height/Length Dosing      154 cm                             Height/Length Measured    154 cm                             BSA Measured               1.69 m2                             Body Mass Index Measured  28.17 kg/m2     General:  Alert and oriented, No acute distress.    Eye:  Pupils are equal, round and reactive to light, Extraocular movements are intact, Normal conjunctiva.    HENT:  Normocephalic, Oral mucosa is moist, No pharyngeal erythema.    Neck:  Supple, No jugular venous distention, No lymphadenopathy, No thyromegaly.    Respiratory:  Lungs are clear to auscultation, Respirations are non-labored, Breath sounds are equal, Symmetrical chest wall expansion, No chest wall tenderness.    Cardiovascular:  Normal rate, Regular rhythm, No murmur, No gallop, No edema.    Breast:  No mass, No tenderness, No discharge.    Gastrointestinal:  Soft, Non-tender, Non-distended, Normal bowel sounds, No organomegaly.    Lymphatics:  No lymphadenopathy neck, axilla, groin.    Musculoskeletal:  Normal strength, No tenderness, No swelling, No deformity, Normal gait.    Integumentary:  Warm, Intact, Moist, No pallor, ring worm on bilateral arms.    Neurologic:  Alert, Oriented, Cranial Nerves II-XII are grossly intact.    Cognition and Speech:  Oriented, Speech clear and coherent, Functional cognition intact.    Psychiatric:  Cooperative, Appropriate mood & affect, Normal judgment.    ECOG Performance Scale: 0 - Fully active; no performance restrictions.      Review / Management   Results review:  Lab results   6/24/2020 8:57 CDT       POC Sodium                142 mmol/L                             POC Potassium             4.0 mmol/L                             POC Chloride              106 mmol/L                             POC Ion Calcium           1.18 mmol/L                             POC Glucose               93 mg/dL                             POC BUN                   11.0 mg/dL                             POC Creatinine            0.7 mg/dL                             POC AGAP                  16.0  NA                             POC Hb                     11.9 mg/dL  LOW                             POC Hct                   35.0 %  LOW                             POC TCO2                  26.0 mmol/L    6/24/2020 8:43 CDT       WBC                       5.4 x10(3)/mcL                             RBC                       3.59 x10(6)/mcL  LOW                             Hgb                       10.9 gm/dL  LOW                             Hct                       33.5 %  LOW                             Platelet                  342 x10(3)/mcL                             MCV                       93.3 fL                             MCH                       30.4 pg                             MCHC                      32.5 gm/dL  LOW                             RDW                       14.4 %                             MPV                       9.1 fL  LOW                             Abs Neut                  3.27 x10(3)/mcL                             NEUT%                     60.3 %  NA                             NEUT#                     3.3 x10(3)/mcL                             LYMPH%                    27.8 %  NA                             LYMPH#                    1.5 x10(3)/mcL                             MONO%                     9.9 %  NA                             MONO#                     0.5 x10(3)/mcL                             EOS%                      0.9 %  NA                             EOS#                      0.0 x10(3)/mcL                             BASO%                     0.7 %  NA                             BASO#                     0.0 x10(3)/mcL  .    Laboratory Results   Lines and Tubes:  Peripheral catheter.    Response to Treatment:  Complete response.       Impression and Plan   30-year-old premenopausal female status post bilateral mastectomies,  Left breast multifocal disease, pathological staging T1 cN1 M0, grade 2, ER MN positive, HER-2/cher negative,  1 lymph node, 0.8 cm, no extracapsular extension)    Mild capsulitis of  the right shoulder    c/p -prob due to Neulasta    Reaction to EMEND  ring worm      PLAN:   Fertility preservation--patient refused.  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  continue physical therapy instructed to go back to physical therapy,   Decadron twice daily x3 days after chemo         Claritin 10 mg x 5 days  Zoladex -continue monthly-  Taxotere -Cytoxan x4 cycles- UPT day of treatment       Cycle 4 today  no dose changes---  d/c picc line --Today  d/c Emend  if bone pain worse -change Neulasta to 4mg on day 2 and not patch    Patient will complete chemotherapy today, she will discontinue her PICC line after treatment.  We will continue with Zoladex.  She will come back to clinic in 6 weeks time with repeat CBC and CMP.  And she will see Dr. Caldwell tomorrow.  Patient is status post a mastectomy.  23 lymph nodes were examined 1 was positive for macro metastatic disease.  There was no evidence of extracapsular extension  The overall plan will be to start this patient  on Aromasin in combination with her Zoladex.  For 5 years,   we will then add calcium and vitamin D   bone density is checked every 2 years.    She was reinstructed to avoid pregnancy for the next 2 years.  And to discuss with Dr. Caldwell the question of reconstructive surgery        Nilton Pires MD

## 2022-05-02 NOTE — HISTORICAL [facility] CERNER
This is a historical note converted from [employer]. Formatting and pictures may have been removed.  Please reference [employer] for original formatting and attached multimedia. Chief Complaint  Breast Follow up  History of Present Illness  Radha Yen is a pleasant 29 Years old Female who presents with AJCC 8th ed clinical anatomic stage IIA / prognostic stage IA (cT1 cN0 M0) left invasive ductal carcinoma grade 1 ER 98.47 %, WI 99.01%, HER2 cher 0 - negative on IHC.  ?  2/26/20 - Genetics returned as negative. Also, Aater discussing plastic reconstructive options with Dr. Matta and Dr. Ortiz, she has decided to proceed without reconstruction. She desires a bilateral mastectomy with left SLNB.  ?   Her history begins when she felt a burning sensation in her breasts bilaterally (L>R) in November-December 2019. The sensation was also associated with one episode of unilateral (left) nipple discharge. In January 2020, she noticed a lump in her breast.?  ?  A bilateral diagnostic MMG and US was performed on 1/28/20 finding an irregular high density mass in the 3:00 axis posterior depth, correlating with the area of concern. Also in the 3:00 axis but in middle depth, there is a satellite mass with oval shape and obscured margin. Both of these masses are surrounded by fine pleomorphic calcifications in a segmental distribution within the lower outer quadrant from the posterior to anterior depths, extending to within 1.2 cm from the base of the nipple. The greatest extent of these calcification is 8 cm. The US of the mass at the 3:00 axis 6 cm FN measured 1.7 x 2 x 1.1 cm. This mass was associated with subtle skin retraction. The satellite mass at the 3:00 position 3 cm FN measured 0.4 x 0.4 x 0.2 cm. These 2 masses were measured to be 2.5 cm from each other. The imaging also showed no evidence of abnormal axillary or intramammary nodes. The right diagnostic MMG was benign.  ?  Three core biopsies were taken on 1/29/20 of  the 3:00 dominant mass 6 cm FN, the 3:00 satellite mass 3 cm FN, and the left breast calcifications. Pathology of the 3:00 dominant mass 6cmfn revealed IDC grade 2 and DCIS grade 1. The 3:00 satellite mass 3 cm FN was IDC, grade I, and the L breast calcifications was DCIS grade I. The ER/NM expression was determined to be ER 98.47% and NM 99.01%. HER2 status was negative.  ?  Last Menstrual Period : 2/3/2020 CST?  Menarche Onset : 9 year(s)?  Menstrual Comments : 0 pregnancies, 0 child births  Used BC for about 6 months @ age [age]?  No treatments to get pregnant  She was adopted so her FH of cancer is unknown.  ?  Review of Systems  Constitutional: denies fevers, chills, weight loss  HEENT: denies blurry/double vision, changes in hearing  Respiratory: denies cough, shortness of breath  Cardiovascular: denies palpitations, swelling of the extremities  GI: denies abdominal pain, nausea/vomiting, hematochezia, frequent stools  : denies frequency, dysuria, flank pain, hematuria  Skin: denies new rashes  Neurological: denies muscular/sensory deficiencies, loss of coordination, headaches, memory changes  Endo: denies hair loss/thinning, nervousness, hot flashes, heat/cold intolerance, lumps in the neck area  Heme: denies easy bruising and fatigue  Psychological: denies anxious/depressive moods  Musculoskeletal: denies bony pain, muscle cramps, swollen joints  ?  Physical Exam  Vitals & Measurements  T:?36.9? ?C (Oral)? HR:?63(Peripheral)? RR:?20? BP:?116/80? SpO2:?100%?  HT:?154?cm? WT:?60.32?kg? BMI:?25.43? LMP:?02/03/2020 00:00 CST?  General: The patient is awake, alert and oriented times three. She is well nourished and in no acute distress.?  Neck: There is no evidence of palpable cervical, supraclavicular or axillary adenopathy. The neck is supple. The thyroid is not enlarged.?  Musculoskeletal: She has a normal range of motion of her bilateral upper extremities.?  Chest: Examination of the chest wall fails to  reveal any obvious abnormalities. There is no nipple retraction, and there is no skin dimpling with movement of the pectoralis. Her lungs are clear to auscultation bilaterally without rales, rhonchi, or wheezing.?  Cardiovascular: Her heart has a regular rate and rhythm without murmurs, gallops or rubs.?  Breast: Examination of right breast fails to reveal any dominant masses or areas of significant focal nodularity. Examination of the left breast reveals a palpable lump in the 3:00 position of the breast. Her nipples are everted bilaterally without evidence of discharge. The nipple/areolar complexes are normal bilaterally. There is subtle skin dimpling over the area of the lump. No other significant skin changes.  Abdomen: The abdomen is soft, flat, nontender and nondistended with no palpable masses or organomegaly.  ?  Assessment/Plan  Radha Yen is a pleasant 29 Years old Female who presents with AJCC 8th ed clinical anatomic stage IIA / prognostic stage IA (cT1 cN0 M0) left invasive ductal carcinoma grade 1 ER 98.47 %, IN 99.01%, HER2 cher 0 - negative on IHC.  ?  2/26/20 - Genetics returned as negative. Also, Aater discussing plastic reconstructive options with Dr. Matta and Dr. Ortiz, she has decided to proceed without reconstruction. She desires a bilateral mastectomy with left SLNB.  ?  Last Visit:We discussed the need to proceed with local and systemic treatment. Local treatment options are surgery and radiation. The choices for surgical operations include mastectomy and lumpectomy with radiation. The general operative procedure of mastectomy, the skin sparing nature and attempts made to save underlying muscle with modified mastectomy were discussed. The options of primary reconstruction following mastectomy include either implant reconstruction or tissue transfer type of reconstruction. The pros and cons of both of these reconstructive methods were addressed. Both of these are performed in stages  and second operation is usually required before the final completion of the reconstructive process. I also explained to the patient that the reconstructive options are generally by law required to be covered by insurance and would be considered part of a cancer operation and not a cosmetic operation. Sometimes also a reduction or mastopexy is performed on the opposite side for better match, and this operation by itself is also considered part of the cancer treatment.?  ?  Following explanation of the mastectomy option, I then explained to her the procedure of lumpectomy. The rationale for lumpectomy, the need to obtain clear margins was specifically addressed. The absolute necessity of adding radiation following lumpectomy with good margins was explained. The logistics of radiation were discussed at length. The recently available choice of trial, which is available for accelerated radiation, was also addressed. The patient will further discuss details of radiation with her Radiation Oncologist.?  ?  Following this, I also explained to her the procedure of sentinel lymph node mapping and its rationale. We have a 98% success rate identifying the sentinel node. The need to use radioactive dye and blue dye to ensure proper identification of the node was explained. Also the small but real risk of anaphylactic shock with blue dye was discussed. The 5 to 10% false/negative rate of sentinel lymph node mapping and approximately 10% delayed positive rate of sentinel lymph node mapping was discussed. The process of Touch-Prep and its significance was also explained. The need to perform completion dissection should the sentinel lymph node be positive was also explained to the patient.?  ?  Following this, I have also briefly discussed with her the rationale for adjuvant systemic treatment in the form of either chemotherapy and/or hormonal therapy. This would depend on the presence of hormone receptors in the tumor. Further  recommendations regarding the kind of chemo and the cycles would be finalized by Medical Oncology. She will discuss these issues at length with her medical oncologist. Specifically, we went over the local recurrence rate and survival rates with mastectomy and breast conserving therapy, the small but real risk of lymphedema should we need completion dissection, and the indications for post mastectomy radiation in certain subset of patients.?  ?  All of her questions were satisfactorily answered.?  ?   Today: We discussed the choices for surgical operations including mastectomy and lumpectomy with radiation. The general operative procedure of mastectomy, the skin sparing nature and attempts made to save underlying muscle with modified mastectomy were discussed. Following this, I also explained to her the procedure of sentinel lymph node mapping and its rationale. We have a 98% success rate identifying the sentinel node. The need to use radioactive dye and blue dye to ensure proper identification of the node was explained. Also the small but real risk of anaphylactic shock with blue dye was discussed. The 5 to 10% false/negative rate of sentinel lymph node mapping and approximately 10% delayed positive rate of sentinel lymph node mapping was discussed. The process of Touch-Prep and its significance was also explained. The need to perform completion dissection should the sentinel lymph node be positive was also explained to the patient.?  ?  PLAN:  1. Bilateral mastectomy with left sentinel lymph node biopsy on 3/6/20. Written consent obtained.  2. Will need to determine hormone status of other areas through pathology from surgical tumor excision.  ?   AKOSUA Coleman  ?   Dina Caldwell MD  ?   Problem List/Past Medical History  Ongoing  Abnormal weight gain  Hormone imbalance  Wellness surveillance  Historical  No qualifying data  Procedure/Surgical History  Biopsy, breast, with placement of breast  localization device(s) (eg, clip, metallic pellet), when performed, and imaging of the biopsy specimen, when performed, percutaneous; each additional lesion, including ultrasound guidance (List separately in additi (01/29/2020)  Biopsy, breast, with placement of breast localization device(s) (eg, clip, metallic pellet), when performed, and imaging of the biopsy specimen, when performed, percutaneous; first lesion, including stereotactic guidance (01/29/2020)  Biopsy, breast, with placement of breast localization device(s) (eg, clip, metallic pellet), when performed, and imaging of the biopsy specimen, when performed, percutaneous; first lesion, including ultrasound guidance (01/29/2020)  Excision of Left Breast, Percutaneous Approach, Diagnostic (01/29/2020)  Breast biopsy and related procedures  None   Medications  Ibuprofen Oral Tab.  Lexapro 5 mg oral tablet, 5 mg= 1 tab(s), Oral, Daily  propranolol 40 mg oral tablet, 40 mg= 1 tab(s)  Allergies  Lactose  Social History  Abuse/Neglect  No, 02/18/2020  Alcohol  Current, 1-2 times per month, 10/01/2015  Employment/School  Employed, 10/01/2015  Exercise  Exercise frequency: 3-4 times/week., 10/01/2015  Home/Environment  Living situation: Home/Independent., 10/01/2015  Nutrition/Health  Regular, 10/01/2015  Substance Use  Never, 10/01/2015  Tobacco  Never smoker, 10/01/2015  Family History  Patient was adopted  Family history is negative  Health Maintenance  Health Maintenance  ???Pending?(in the next year)  ??? ??OverDue  ??? ? ? ?Cervical Cancer Screening due??12/01/18??and every 3??year(s)  ??? ??Due?  ??? ? ? ?Alcohol Misuse Screening due??01/01/20??and every 1??year(s)  ??? ? ? ?ADL Screening due??02/26/20??and every 1??year(s)  ??? ? ? ?Tetanus Vaccine due??02/26/20??and every 10??year(s)  ??? ??Due In Future?  ??? ? ? ?Obesity Screening not due until??01/01/21??and every 1??year(s)  ??? ? ? ?Blood Pressure Screening not due until??02/25/21??and every  1??year(s)  ??? ? ? ?Body Mass Index Check not due until??02/25/21??and every 1??year(s)  ???Satisfied?(in the past 1 year)  ??? ??Satisfied?  ??? ? ? ?Blood Pressure Screening on??02/26/20.??Satisfied by Marcy Stark  ??? ? ? ?Body Mass Index Check on??02/26/20.??Satisfied by Marcy Stark  ??? ? ? ?Obesity Screening on??02/26/20.??Satisfied by Marcy Stark  ?  Diagnostic Results  reviewed.      The H&P was reviewed, the patient was examined and there are no changes to the patients condition.

## 2022-05-02 NOTE — HISTORICAL [facility] CERNER
This is a historical note converted from [employer]. Formatting and pictures may have been removed.  Please reference [employer] for original formatting and attached multimedia. DATE OF SERVICE:?03/06/20  ?  SURGEON: Dr. Dina Caldwell  ?   ASSIST: None  ?   PREOPERATIVE DIAGNOSIS: Left breast cancer  ?   POSTOPERATIVE DIAGNOSIS: Left breast cancer  ?  PROCEDURE:  1. Left subareolar injection of isosulfan blue dye  2. Left axillary sentinel lymph node biopsy  3. Left completion axillary lymph node dissection  4. Left simple mastectomy  5. Right axillary lymph node biopsy  6. Right simple mastectomy  ?  ANESTHESIA: General plus 30 mL of 0.5% Bupivacaine  ?  ESTIMATED BLOOD LOSS: 20 mL  ?  FINDINGS:  1. Left axillary lymph nodes - some of which are hard to the touch and concerning for metastasis  2. Left breast tissue  3. Right axillary lymph node with dye which appeared blue but after evaluation by pathology they were negative and likely taken up by tattoo ink  4. Right breast tissue  ?  SPECIMEN:?  1. Left axillary sentinel lymph node #1, hot and blue (positive intra-op)  2.Left axillary sentinel lymph node #2, hot and blue  3. Left axillary sentinel lymph node #3, hot and blue  4. Left axillary sentinel lymph node #4, hot and blue  5. Left axillary sentinel lymph node #5, hot and blue  6. Left simple mastectomy  7. Right axillary lymph node #1, blue (negative intra-op - ink likely from tattoo)  8. Right axillary lymph node #2, blue (negative intra-op - ink likely from tattoo)  9. Right simple mastectomy  ?  DRAINS:  1 HOANG drain right (chest)  2 HOANG drains left (chest and axilla)  ?  COMPLICATIONS: None  ?  PROCEDURE INDICATION:  Radha Yen is a pleasant 29 Years old Female who presents with AJCC 8th ed clinical anatomic stage IIA / prognostic stage IA (cT1 cN0 M0) left invasive ductal carcinoma grade 1 ER 98.47 %, [place] 99.01%, HER2 cher 0 - negative on IHC.  ?  We discussed the need to proceed with local and systemic  treatment. Local treatment options are surgery and radiation. The choices for surgical operations include mastectomy and lumpectomy with radiation. The general operative procedure of mastectomy, the skin sparing nature and attempts made to save underlying muscle with modified mastectomy were discussed. The options of primary reconstruction following mastectomy include either implant reconstruction or tissue transfer type of reconstruction. The pros and cons of both of these reconstructive methods were addressed. Both of these are performed in stages and second operation is usually required before the final completion of the reconstructive process. I also explained to the patient that the reconstructive options are generally by law required to be covered by insurance and would be considered part of a cancer operation and not a cosmetic operation. Sometimes also a reduction or mastopexy is performed on the opposite side for better match, and this operation by itself is also considered part of the cancer treatment.?  ?  Following explanation of the mastectomy option, I then explained to her the procedure of lumpectomy. The rationale for lumpectomy, the need to obtain clear margins was specifically addressed. The absolute necessity of adding radiation following lumpectomy with good margins was explained. The logistics of radiation were discussed at length. The recently available choice of trial, which is available for accelerated radiation, was also addressed. The patient will further discuss details of radiation with her Radiation Oncologist.?  ?  Following this, I also explained to her the procedure of sentinel lymph node mapping and its rationale. We have a 98% success rate identifying the sentinel node. The need to use radioactive dye and blue dye to ensure proper identification of the node was explained. Also the small but real risk of anaphylactic shock with blue dye was discussed. The 5 to 10% false/negative rate  of sentinel lymph node mapping and approximately 10% delayed positive rate of sentinel lymph node mapping was discussed. The process of Touch-Prep and its significance was also explained. The need to perform completion dissection should the sentinel lymph node be positive was also explained to the patient.?  ?  She elected to proceed with bilateral simple mastectomy with sentinel lymph node biopsy possible axillary lymph node dissection.  ?  PROCEDURE DESCRIPTION:  Prior to the operating room the patient was injected with Technetium-99 (TC-99) sulfur colloid by the nuclear medicine. The patient was then brought to the operating room and placed supine on the operative table. General anesthesia was induced.?3 cc?s of?isosulfan blue dye was injected in the subareolar space of the left breast?and gently messaged. The skin of the?Left and Right breast was prepped and draped in standard sterile surgical fashion along with the Left and Right axilla and upper arm. A time-out was completed verifying correct patient, procedure, site, positioning and equipment prior to beginning the procedure.?  ?  A large?elliptical skin incision was made that encompassed the nipple-areola complex and the previous biopsy site on the Left. Flaps were raised in the avascular plane between subcutaneous tissue and breast tissue from the clavicle superiorly, the sternum medially, the anterior rectus sheath inferiorly, and the lateral border of the pectoralis major muscle laterally. Hemostasis was achieved in the flaps. Next, the breast tissue and underlying pectoralis fascia were excised from the pectoralis major muscle, progressing from medial to lateral. At the lateral border of the pectoralis major muscle, the breast tissue was sung laterally and a lateral pedicle identified where breast tissue gave way to fat of axilla. Prior to completely removing the breast tissue the sentinel lymph node biopsy was performed.  ?  The lateral?border of the  pectoralis of the?Left/Right axilla was excised and the axilla was entered.?Using a hand-held gamma probe ([email or web address])?the axilla was assessed for a sentinel lymph node. Initial counts prior to excision the lymph node measured 3400s. Blue staining was?noted at this time. The lymph node was excised and the gamma probe was placed next to it which measured 3400s. The cavity was reassessed with the hand held gamma probe and found to have a count of 1800s. Four additional?lymph nodes were identified and removed. The lymph nodes were sent as specimen sentinel lymph node #1, #2, #3, #4?and #5. While in the operating room pathology?reported the sentinel lymph node #1?was Positive for at least 8 mm metastatic carcinoma. Further?dissection was undertaken.?The lateral pedicle was incised, removed and the specimen was marked. Superiorly was a short suture and laterally was a long suture.?  ?  The?edge of?the pectoralis major muscled was identified?and it was used to dissect?up towards the axillary vein.?The Pectoralis major muscle was retracted medially to expose the pectoralis minor?muscle?and allow dissection of Rotters lymph nodes. The clavipectoral fascia?was divided at the level of the inferior axillary sheath to?expose the underlying fat pad and axillary?lymph nodes within the fat. Dissections was carried posteriorly until the axillary vein was visualized. The fatty tissue and nodes were dissected off the chest wall and inferior surface of the the axillary vein. The long thoracic nerve bundle was identified just lateral to and?along the chest wall. The thoracodorsal neurovascular bundle was identified in the mid-axilla posteriorly along the anterior surface of the latissimus dorsi muscle. Level I and II axillary lymph nodes were removed.  ?  A large elliptical skin incision was made to the Right breast tissue that included the nipple-areolar complex. Flaps were raised in the?avascular plane between subcutaneous tissue and  breast tissue was used for to raise flaps from the clavicle superiorly, the sternum medially, the anterior rectus sheath inferiorly, and the lateral border of the pectoralis major muscle laterally. Hemostasis was maintained in the flaps. Next, the breast tissue and underlying pectoralis fascia were excised from the pectoralis major muscle, progressing from medial to lateral. At the lateral border of the pectoralis major muscle, the breast tissue was sung laterally and a lateral pedicle identified where breast tissue gave way to fat of axilla. The lateral pedicle was incised, removed and the specimen was marked. Superiorly was a short suture and laterally was a long suture. Upon examination of the right axilla there was note of dye staining which was thought to be blue of two lymph nodes but no counts were obtained. These were removed and sent to pathology for review. While in the operating room pathology reported the lymph nodes negative and given she had tattoos the likely reason for the dye color was from the tattoos.  ?  Both cavities were irrigated and hemostasis checked. One Mio-Jiang drain was placed via a separate incision into the right mastectomy site. Two Mio-Jiang drains were place on the left via separate incisions, one into the left mastectomy site and the other into the axilla.  ?  The both cavities were closed with interrupted 3-0 Vicryl sutures.?4-0 Monocryl subcuticular running stitch to close the skin. Exofin was applied over both incisions followed by sterile gauze and bra.  ?  ?  The?patient was awakened from anesthesia and taken to the postanesthesia care unit in stable condition.?

## 2022-05-02 NOTE — HISTORICAL [facility] CERNER
This is a historical note converted from [employer]. Formatting and pictures may have been removed.  Please reference [employer]ner for original formatting and attached multimedia. Admit and Discharge Dates  Admit Date: 03/06/2020  Discharge Date: 03/07/2020  Physicians  Attending Physician - Javi JOHNSON, Dina BALLARD  Admitting Physician - Javi JOHNSON, Dina BALLARD  Consulting Physician - Javi JOHNSON, Dina BALLARD  Primary Care Physician - Liv JOHNSON, Hui  Surgical Procedures  03/06/2020 - [id number] - Mastectomy Simple  03/06/2020 - [id number] - Biopsy Sentinal Node  03/06/2020 - [id number] - Axillary Node Dissection  Immunizations  No immunizations recorded for this visit.  Admission Information  Radha Yen is a pleasant 29 Years old Female who presents with AJCC 8th ed clinical anatomic stage IIA / prognostic stage IA (cT1 cN0 M0) left invasive ductal carcinoma grade 1 ER 98.47 %, TN 99.01%, HER2 cher 0 - negative on IHC. Now status post bilateral simple mastectomy with left axillary lymph node dissection on 3/6/2020.  Hospital Course  POD1 she is doing well. She does have some pain in her left axilla after surgery. Otherwise she states she is doing well. Overnight she had some nausea after surgery but this has resolved and she has a good appetite now.  Significant Findings  Left axillary lymph node positive for metastatic carcinoma  Time Spent on discharge  30 Minutes  Objective  Vitals & Measurements  Vital signs were reviewed and are within normal limits. NO signs of distress  Physical Exam  Bilateral mastectomy incisions area healing well. HOANG drains are serosanguineous.  Patient Discharge Condition  Good Condition  Discharge Disposition  Home with HOANG drains   Discharge Medication Reconciliation  Prescribed  acetaminophen-oxyCODONE (Percocet 5/325 oral tablet)?1 tab(s), Oral, q4hr, PRN for pain  Continue  Misc Rx Supply (CBD oil)?1 drop(s), SL, Daily, PRN anxiety  clotrimazole topical (clotrimazole 1% topical cream),  BID  escitalopram (Lexapro 5 mg oral tablet)?5 mg, Oral, Daily  ibuprofen (ibuprofen 200 mg oral tablet)?400 mg, Oral, q4hr, PRN as needed for pain  propranolol (propranolol 40 mg oral tablet)?40 mg, Oral, Daily, PRN anxiety  Education and Orders Provided  Breast Surgery Post-Op Instructions (SKGRISBY)  HOANG Drain Home Care (SKGRISBY)  DVT Recognition and Prevention (TLBOOTHE)  ALL--Preventing Constipation After Surgery (CUSTOM)  Infection Control in the Home (CLBOUTTE)  Discharge - 03/07/20 9:40:00 CST, Home, with HOANG drains?  Follow up  Dina Caldwell, within 10 days, on 03/17/2020  ????  Keep scheduled appointment

## 2022-05-13 DIAGNOSIS — C50.412 MALIGNANT NEOPLASM OF UPPER-OUTER QUADRANT OF LEFT FEMALE BREAST, UNSPECIFIED ESTROGEN RECEPTOR STATUS: Primary | ICD-10-CM

## 2022-05-18 PROBLEM — F41.9 ANXIETY: Status: ACTIVE | Noted: 2022-05-18

## 2022-05-18 PROBLEM — C50.412 MALIGNANT NEOPLASM OF UPPER-OUTER QUADRANT OF LEFT BREAST IN FEMALE, ESTROGEN RECEPTOR POSITIVE: Status: ACTIVE | Noted: 2020-02-12

## 2022-05-18 PROBLEM — Z90.13 H/O BILATERAL MASTECTOMY: Status: ACTIVE | Noted: 2022-05-18

## 2022-05-18 PROBLEM — Z17.0 MALIGNANT NEOPLASM OF UPPER-OUTER QUADRANT OF LEFT BREAST IN FEMALE, ESTROGEN RECEPTOR POSITIVE: Status: ACTIVE | Noted: 2020-02-12

## 2022-05-18 PROBLEM — I97.2 POSTMASTECTOMY LYMPHEDEMA SYNDROME: Status: ACTIVE | Noted: 2022-05-18

## 2022-05-18 RX ORDER — BUPROPION HYDROCHLORIDE 150 MG/1
150 TABLET, EXTENDED RELEASE ORAL DAILY
COMMUNITY
Start: 2022-04-20 | End: 2022-11-29

## 2022-05-18 RX ORDER — PROPRANOLOL HYDROCHLORIDE 60 MG/1
CAPSULE, EXTENDED RELEASE ORAL
COMMUNITY
Start: 2021-12-29

## 2022-05-18 RX ORDER — VENLAFAXINE HYDROCHLORIDE 75 MG/1
CAPSULE, EXTENDED RELEASE ORAL
COMMUNITY
Start: 2022-04-14 | End: 2022-11-29

## 2022-05-18 RX ORDER — TAMOXIFEN CITRATE 10 MG/1
TABLET ORAL
COMMUNITY
Start: 2022-01-04 | End: 2022-05-23 | Stop reason: SDUPTHER

## 2022-05-19 PROBLEM — C50.812 MALIGNANT NEOPLASM OF OVERLAPPING SITES OF LEFT BREAST IN FEMALE, ESTROGEN RECEPTOR POSITIVE: Status: ACTIVE | Noted: 2020-02-12

## 2022-05-19 NOTE — PROGRESS NOTES
Heme/Onc Progress Note    PATIENT: Radha Yen  MRN: [id number]  DATE: 5/23/2022  Chief Complaint: Lump right chest/breast area and Burning around right breast    Current Treatment:  Low dose tamoxifen     Oncology History   Malignant neoplasm of overlapping sites of left breast in female, estrogen receptor positive   2/12/2020 Initial Diagnosis    Malignant neoplasm of overlapping sites of left breast  Multifocal     2/13/2020 Genetic Testing    [facility] genetic panel negative     3/6/2020 Surgery    3/6/2020: Left simple mastectomy: Multifocal               A.  Invasive ductal carcinoma 2 cm grade 2  [id number]              B.  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 Right simple mastectomy --sentinel lymph node biopsy negative for disease.  Final pathology revealed [id number] ed pathological anatomic stage IA / prognostic stage IA (pT1c(mf) pN1a M0) left breast INVASIVE DUCTAL CARCINOMA, MULTIFOCAL (X2)              3:00 O'CLOCK 6 CMFN: INVASIVE DUCTAL CARCINOMA, GRADE 2 (2.0 CM              3:00 O'CLOCK 3 CMFN: INVASIVE MUCINOUS CARCINOMA, GRADE 1 (0.2 CM.)              DUCTAL CARCINOMA IN SITU, LOW GRADE CRIBRIFORM TYPE, MULTIFOCAL (X3)              INVASIVE CARCINOMA AND DCIS ARE 1.0 CM FROM THE ANTERIOR/SKIN MARGIN.               ONE OF 23 LYMPH NODES POSITIVE FOR METASTATIC CARCINOMA     3/6/2020 Cancer Staged    Staging form: Breast, AJCC 8th Edition  - Pathologic stage from 3/6/2020: Stage IA (pT1c, pN1a, cM0, G2, ER+, MD+, HER2-)     4/15/2020 - 12/23/2020 Hormone Therapy    Zoladex for fertility preservation and treatment  Stopped due to intolerance with Aromasin anxiety     4/23/2020 - 6/24/2020 Chemotherapy      4 cycles of adjuvant chemotherapy Taxotere/Cytoxan      8/6/2020 -  10/28/2020 Hormone Therapy    aromasin --stopped due to intolerance       1/13/2021 - 11/22/2021 Hormone Therapy    Tamoxifen, 1/13/21,-- 11/22/21 stopped mood swings         1/4/2022 -  Hormone Therapy    Tamoxifen 5 mg b.i.d.         05/23/2022  This patient was last here on February of 2022. She had been intolerant to Zoladex, and Aromasin.  She was started on tamoxifen.  Was intolerant to normal doses tamoxifen.  In this which down the lower dose tamoxifen as a prevention still.  She still has occasional difficulty with lymphedema.  At last visit she was complaining of bright red blood per rectum was asked to contact her GI physician    She has tenderness in the right side of chest wall.  The rest of her review of systems are unchanged.  GI is still pending      Past Medical History:   Diagnosis Date    Anxiety disorder, unspecified     Breast cancer     Depression     Twin birth     Weight increase         Current Outpatient Medications:     buPROPion (WELLBUTRIN SR) 150 MG TBSR 12 hr tablet, Take 150 mg by mouth once daily., Disp: , Rfl:     propranoloL (INDERAL LA) 60 MG 24 hr capsule, TAKE ONE CAPSULE ONCE DAILY, Disp: , Rfl:     venlafaxine (EFFEXOR-XR) 75 MG 24 hr capsule, TAKE ONE CAPSULE ONCE DAILY, Disp: , Rfl:     tamoxifen (NOLVADEX) 10 MG Tab, TAKE (1/2) TABLET TWICE DAILY.., Disp: 60 tablet, Rfl: 1     Review of Systems:   Pertinent positives and negatives included in the HPI. Otherwise a complete review of systems is negative.      Objective:     Vitals:    05/23/22 1201   BP: 126/86   Pulse: 69   Temp: 99 °F (37.2 °C)         Physical Exam    Normal Physical exam:  VITAL SIGNS:  Reviewed.  [  ]  GENERAL: [Well-developed well-nourished in no apparent cardiorespiratory distress]    HEAD: Normocephalic, atraumatic  EYES:  Pupils are equal.   reactive to light.  Extraocular motions intact.  No scleral icterus.  No pallor.  EARS:  Hearing grossly intact.  MOUTH:  Oropharynx is clear without  exudates or erythema.   NECK: Supple no adenopathy, no JVD.  Trachea midline  CHEST:  Chest with clear breath sounds bilaterally.  No wheezes, rales, or rhonchi.    CARDIAC:  Regular rate and rhythm.  S1 and S2, without murmurs, gallops, or rubs.  VASCULAR:  No Edema.  Peripheral pulses normal and equal in all extremities.  ABDOMEN:  Soft, without detectable tenderness.  No sign of distention.  No   rebound or guarding, and no masses palpated.   Bowel Sounds normal.  MUSCULOSKELETAL:  Good range of motion of all major joints. Extremities without clubbing, cyanosis or edema.    NEUROLOGIC EXAM:  Alert and oriented x 3.  No focal sensory or strength deficits.   Speech normal.  Follows commands.  PSYCHIATRIC:  Mood normal.  Chest wall:  Bilateral mastectomy, on the right chest wall there is a little bit of mild tenderness around the surgical scar, there is no definitive mass or nodule.  She does have a little bit of fluid there is no palpable supraclavicular infraclavicular axillary inguinal adenopathy    ECOG SCORE    0 - Fully active-able to carry on all pre-disease performance without restriction        Assessment and Plan     Problem List Items Addressed This Visit        Oncology    Malignant neoplasm of overlapping sites of left breast in female, estrogen receptor positive - Primary    Current Assessment & Plan     Continue low-dose tamoxifen  Return to clinic in 6 months with CBC, CMP urine pregnancy test  Consider extended therapy to 10 years (started hormonal therapy in August of 2020)           Relevant Medications    tamoxifen (NOLVADEX) 10 MG Tab    Other Relevant Orders    CBC Auto Differential    Comprehensive Metabolic Panel    POCT urine pregnancy        Patient understands the risk and benefits of low-dose tamoxifen.  She was intolerant to normal doses of Zoladex, tamoxifen, and aromatase inhibitors.  She is going to follow-up for her ultrasound of the chest wall this week and surgery              Med  Onc Chart Routing      Follow up with physician    Follow up with VICKIE 6 months.   Labs CBC and CMP   Lab interval:  UPT   Imaging    Pharmacy appointment    Other referrals              Orders Placed This Encounter   Procedures    CBC Auto Differential     Standing Status:   Standing     Number of Occurrences:   3     Standing Expiration Date:   11/23/2023    Comprehensive Metabolic Panel     Standing Status:   Standing     Number of Occurrences:   3     Standing Expiration Date:   11/23/2023    CBC with Differential    POCT urine pregnancy     Standing Status:   Standing     Number of Occurrences:   3     Standing Expiration Date:   11/23/2023

## 2022-05-20 ENCOUNTER — LAB VISIT (OUTPATIENT)
Dept: LAB | Facility: HOSPITAL | Age: 32
End: 2022-05-20
Attending: INTERNAL MEDICINE
Payer: COMMERCIAL

## 2022-05-20 DIAGNOSIS — C50.412 MALIGNANT NEOPLASM OF UPPER-OUTER QUADRANT OF LEFT FEMALE BREAST, UNSPECIFIED ESTROGEN RECEPTOR STATUS: ICD-10-CM

## 2022-05-20 DIAGNOSIS — C50.412 MALIGNANT NEOPLASM OF UPPER-OUTER QUADRANT OF LEFT FEMALE BREAST, UNSPECIFIED ESTROGEN RECEPTOR STATUS: Primary | ICD-10-CM

## 2022-05-20 LAB
ALBUMIN SERPL-MCNC: 4.4 GM/DL (ref 3.5–5)
ALBUMIN/GLOB SERPL: 1.3 RATIO (ref 1.1–2)
ALP SERPL-CCNC: 101 UNIT/L (ref 40–150)
ALT SERPL-CCNC: 17 UNIT/L (ref 0–55)
AST SERPL-CCNC: 18 UNIT/L (ref 5–34)
BASOPHILS # BLD AUTO: 0.04 X10(3)/MCL (ref 0–0.2)
BASOPHILS NFR BLD AUTO: 0.4 %
BILIRUBIN DIRECT+TOT PNL SERPL-MCNC: 0.6 MG/DL
BUN SERPL-MCNC: 7 MG/DL (ref 7–18.7)
CALCIUM SERPL-MCNC: 9.8 MG/DL (ref 8.4–10.2)
CHLORIDE SERPL-SCNC: 104 MMOL/L (ref 98–107)
CO2 SERPL-SCNC: 24 MMOL/L (ref 22–29)
CREAT SERPL-MCNC: 0.84 MG/DL (ref 0.55–1.02)
EOSINOPHIL # BLD AUTO: 0.05 X10(3)/MCL (ref 0–0.9)
EOSINOPHIL NFR BLD AUTO: 0.5 %
ERYTHROCYTE [DISTWIDTH] IN BLOOD BY AUTOMATED COUNT: 12.8 % (ref 11.5–17)
GLOBULIN SER-MCNC: 3.4 GM/DL (ref 2.4–3.5)
GLUCOSE SERPL-MCNC: 98 MG/DL (ref 74–100)
HCT VFR BLD AUTO: 38.1 % (ref 37–47)
HGB BLD-MCNC: 12.4 GM/DL (ref 12–16)
IMM GRANULOCYTES # BLD AUTO: 0.02 X10(3)/MCL (ref 0–0.02)
IMM GRANULOCYTES NFR BLD AUTO: 0.2 % (ref 0–0.43)
LYMPHOCYTES # BLD AUTO: 1.36 X10(3)/MCL (ref 0.6–4.6)
LYMPHOCYTES NFR BLD AUTO: 13.4 %
MCH RBC QN AUTO: 29.6 PG (ref 27–31)
MCHC RBC AUTO-ENTMCNC: 32.5 MG/DL (ref 33–36)
MCV RBC AUTO: 90.9 FL (ref 80–94)
MONOCYTES # BLD AUTO: 0.72 X10(3)/MCL (ref 0.1–1.3)
MONOCYTES NFR BLD AUTO: 7.1 %
NEUTROPHILS # BLD AUTO: 8 X10(3)/MCL (ref 2.1–9.2)
NEUTROPHILS NFR BLD AUTO: 78.4 %
PLATELET # BLD AUTO: 364 X10(3)/MCL (ref 130–400)
PMV BLD AUTO: 10.2 FL (ref 9.4–12.4)
POTASSIUM SERPL-SCNC: 3.8 MMOL/L (ref 3.5–5.1)
PROT SERPL-MCNC: 7.8 GM/DL (ref 6.4–8.3)
RBC # BLD AUTO: 4.19 X10(6)/MCL (ref 4.2–5.4)
SODIUM SERPL-SCNC: 138 MMOL/L (ref 136–145)
WBC # SPEC AUTO: 10.2 X10(3)/MCL (ref 4.5–11.5)

## 2022-05-20 PROCEDURE — 36415 COLL VENOUS BLD VENIPUNCTURE: CPT

## 2022-05-20 PROCEDURE — 80053 COMPREHEN METABOLIC PANEL: CPT

## 2022-05-20 PROCEDURE — 85025 COMPLETE CBC W/AUTO DIFF WBC: CPT

## 2022-05-23 ENCOUNTER — OFFICE VISIT (OUTPATIENT)
Dept: HEMATOLOGY/ONCOLOGY | Facility: CLINIC | Age: 32
End: 2022-05-23
Attending: INTERNAL MEDICINE
Payer: COMMERCIAL

## 2022-05-23 VITALS
OXYGEN SATURATION: 99 % | WEIGHT: 149.5 LBS | HEART RATE: 69 BPM | BODY MASS INDEX: 28.22 KG/M2 | TEMPERATURE: 99 F | SYSTOLIC BLOOD PRESSURE: 126 MMHG | DIASTOLIC BLOOD PRESSURE: 86 MMHG | HEIGHT: 61 IN

## 2022-05-23 DIAGNOSIS — Z17.0 MALIGNANT NEOPLASM OF OVERLAPPING SITES OF LEFT BREAST IN FEMALE, ESTROGEN RECEPTOR POSITIVE: Primary | ICD-10-CM

## 2022-05-23 DIAGNOSIS — C50.812 MALIGNANT NEOPLASM OF OVERLAPPING SITES OF LEFT BREAST IN FEMALE, ESTROGEN RECEPTOR POSITIVE: Primary | ICD-10-CM

## 2022-05-23 PROCEDURE — 3008F PR BODY MASS INDEX (BMI) DOCUMENTED: ICD-10-PCS | Mod: CPTII,S$GLB,, | Performed by: INTERNAL MEDICINE

## 2022-05-23 PROCEDURE — 99999 PR PBB SHADOW E&M-EST. PATIENT-LVL III: ICD-10-PCS | Mod: PBBFAC,,, | Performed by: INTERNAL MEDICINE

## 2022-05-23 PROCEDURE — 3074F PR MOST RECENT SYSTOLIC BLOOD PRESSURE < 130 MM HG: ICD-10-PCS | Mod: CPTII,S$GLB,, | Performed by: INTERNAL MEDICINE

## 2022-05-23 PROCEDURE — 3008F BODY MASS INDEX DOCD: CPT | Mod: CPTII,S$GLB,, | Performed by: INTERNAL MEDICINE

## 2022-05-23 PROCEDURE — 99213 PR OFFICE/OUTPT VISIT, EST, LEVL III, 20-29 MIN: ICD-10-PCS | Mod: S$GLB,,, | Performed by: INTERNAL MEDICINE

## 2022-05-23 PROCEDURE — 99213 OFFICE O/P EST LOW 20 MIN: CPT | Mod: S$GLB,,, | Performed by: INTERNAL MEDICINE

## 2022-05-23 PROCEDURE — 1160F RVW MEDS BY RX/DR IN RCRD: CPT | Mod: CPTII,S$GLB,, | Performed by: INTERNAL MEDICINE

## 2022-05-23 PROCEDURE — 3079F DIAST BP 80-89 MM HG: CPT | Mod: CPTII,S$GLB,, | Performed by: INTERNAL MEDICINE

## 2022-05-23 PROCEDURE — 1159F MED LIST DOCD IN RCRD: CPT | Mod: CPTII,S$GLB,, | Performed by: INTERNAL MEDICINE

## 2022-05-23 PROCEDURE — 3074F SYST BP LT 130 MM HG: CPT | Mod: CPTII,S$GLB,, | Performed by: INTERNAL MEDICINE

## 2022-05-23 PROCEDURE — 1159F PR MEDICATION LIST DOCUMENTED IN MEDICAL RECORD: ICD-10-PCS | Mod: CPTII,S$GLB,, | Performed by: INTERNAL MEDICINE

## 2022-05-23 PROCEDURE — 1160F PR REVIEW ALL MEDS BY PRESCRIBER/CLIN PHARMACIST DOCUMENTED: ICD-10-PCS | Mod: CPTII,S$GLB,, | Performed by: INTERNAL MEDICINE

## 2022-05-23 PROCEDURE — 99999 PR PBB SHADOW E&M-EST. PATIENT-LVL III: CPT | Mod: PBBFAC,,, | Performed by: INTERNAL MEDICINE

## 2022-05-23 PROCEDURE — 3079F PR MOST RECENT DIASTOLIC BLOOD PRESSURE 80-89 MM HG: ICD-10-PCS | Mod: CPTII,S$GLB,, | Performed by: INTERNAL MEDICINE

## 2022-05-23 RX ORDER — TAMOXIFEN CITRATE 10 MG/1
TABLET ORAL
Qty: 60 TABLET | Refills: 1 | Status: SHIPPED | OUTPATIENT
Start: 2022-05-23

## 2022-05-23 NOTE — ASSESSMENT & PLAN NOTE
Continue low-dose tamoxifen  Return to clinic in 6 months with CBC, CMP urine pregnancy test  Consider extended therapy to 10 years (started hormonal therapy in August of 2020)

## 2022-06-06 ENCOUNTER — PATIENT MESSAGE (OUTPATIENT)
Dept: HEMATOLOGY/ONCOLOGY | Facility: CLINIC | Age: 32
End: 2022-06-06
Payer: COMMERCIAL

## 2022-06-08 ENCOUNTER — PATIENT MESSAGE (OUTPATIENT)
Dept: HEMATOLOGY/ONCOLOGY | Facility: CLINIC | Age: 32
End: 2022-06-08
Payer: COMMERCIAL

## 2022-06-16 ENCOUNTER — HOSPITAL ENCOUNTER (OUTPATIENT)
Dept: RADIOLOGY | Facility: HOSPITAL | Age: 32
Discharge: HOME | End: 2022-06-16
Attending: NURSE PRACTITIONER
Payer: COMMERCIAL

## 2022-06-16 VITALS — HEIGHT: 65 IN | BODY MASS INDEX: 24.16 KG/M2 | WEIGHT: 145 LBS

## 2022-06-16 DIAGNOSIS — C50.412 MALIGNANT NEOPLASM OF UPPER-OUTER QUADRANT OF LEFT FEMALE BREAST, UNSPECIFIED ESTROGEN RECEPTOR STATUS: ICD-10-CM

## 2022-06-16 PROCEDURE — 76642 ULTRASOUND BREAST LIMITED: CPT | Mod: 26,RT,, | Performed by: RADIOLOGY

## 2022-06-16 PROCEDURE — 76642 US BREAST RIGHT LIMITED: ICD-10-PCS | Mod: 26,RT,, | Performed by: RADIOLOGY

## 2022-06-16 PROCEDURE — 76642 ULTRASOUND BREAST LIMITED: CPT | Mod: TC,RT

## 2022-11-18 ENCOUNTER — LAB VISIT (OUTPATIENT)
Dept: LAB | Facility: HOSPITAL | Age: 32
End: 2022-11-18
Attending: INTERNAL MEDICINE
Payer: COMMERCIAL

## 2022-11-18 DIAGNOSIS — Z17.0 MALIGNANT NEOPLASM OF OVERLAPPING SITES OF LEFT BREAST IN FEMALE, ESTROGEN RECEPTOR POSITIVE: ICD-10-CM

## 2022-11-18 DIAGNOSIS — C50.812 MALIGNANT NEOPLASM OF OVERLAPPING SITES OF LEFT BREAST IN FEMALE, ESTROGEN RECEPTOR POSITIVE: ICD-10-CM

## 2022-11-18 LAB
ALBUMIN SERPL-MCNC: 4.4 GM/DL (ref 3.5–5)
ALBUMIN/GLOB SERPL: 1.3 RATIO (ref 1.1–2)
ALP SERPL-CCNC: 89 UNIT/L (ref 40–150)
ALT SERPL-CCNC: 13 UNIT/L (ref 0–55)
AST SERPL-CCNC: 16 UNIT/L (ref 5–34)
BASOPHILS # BLD AUTO: 0.03 X10(3)/MCL (ref 0–0.2)
BASOPHILS NFR BLD AUTO: 0.3 %
BILIRUBIN DIRECT+TOT PNL SERPL-MCNC: 0.5 MG/DL
BUN SERPL-MCNC: 10.2 MG/DL (ref 7–18.7)
CALCIUM SERPL-MCNC: 10.1 MG/DL (ref 8.4–10.2)
CHLORIDE SERPL-SCNC: 105 MMOL/L (ref 98–107)
CO2 SERPL-SCNC: 24 MMOL/L (ref 22–29)
CREAT SERPL-MCNC: 0.82 MG/DL (ref 0.55–1.02)
EOSINOPHIL # BLD AUTO: 0.1 X10(3)/MCL (ref 0–0.9)
EOSINOPHIL NFR BLD AUTO: 1.1 %
ERYTHROCYTE [DISTWIDTH] IN BLOOD BY AUTOMATED COUNT: 12.1 % (ref 11.5–17)
GFR SERPLBLD CREATININE-BSD FMLA CKD-EPI: >60 MLS/MIN/1.73/M2
GLOBULIN SER-MCNC: 3.4 GM/DL (ref 2.4–3.5)
GLUCOSE SERPL-MCNC: 91 MG/DL (ref 74–100)
HCT VFR BLD AUTO: 39.7 % (ref 37–47)
HGB BLD-MCNC: 12.9 GM/DL (ref 12–16)
IMM GRANULOCYTES # BLD AUTO: 0.02 X10(3)/MCL (ref 0–0.04)
IMM GRANULOCYTES NFR BLD AUTO: 0.2 %
LYMPHOCYTES # BLD AUTO: 1.81 X10(3)/MCL (ref 0.6–4.6)
LYMPHOCYTES NFR BLD AUTO: 19.9 %
MCH RBC QN AUTO: 29.1 PG (ref 27–31)
MCHC RBC AUTO-ENTMCNC: 32.5 MG/DL (ref 33–36)
MCV RBC AUTO: 89.6 FL (ref 80–94)
MONOCYTES # BLD AUTO: 0.72 X10(3)/MCL (ref 0.1–1.3)
MONOCYTES NFR BLD AUTO: 7.9 %
NEUTROPHILS # BLD AUTO: 6.4 X10(3)/MCL (ref 2.1–9.2)
NEUTROPHILS NFR BLD AUTO: 70.6 %
PLATELET # BLD AUTO: 398 X10(3)/MCL (ref 130–400)
PMV BLD AUTO: 9.7 FL (ref 7.4–10.4)
POTASSIUM SERPL-SCNC: 4.3 MMOL/L (ref 3.5–5.1)
PROT SERPL-MCNC: 7.8 GM/DL (ref 6.4–8.3)
RBC # BLD AUTO: 4.43 X10(6)/MCL (ref 4.2–5.4)
SODIUM SERPL-SCNC: 139 MMOL/L (ref 136–145)
WBC # SPEC AUTO: 9.1 X10(3)/MCL (ref 4.5–11.5)

## 2022-11-18 PROCEDURE — 36415 COLL VENOUS BLD VENIPUNCTURE: CPT

## 2022-11-18 PROCEDURE — 85025 COMPLETE CBC W/AUTO DIFF WBC: CPT

## 2022-11-18 PROCEDURE — 80053 COMPREHEN METABOLIC PANEL: CPT

## 2022-11-22 NOTE — ASSESSMENT & PLAN NOTE
Continue low-dose tamoxifen  Consider extended therapy to 10 years (started hormonal therapy in August of 2020)  No mammograms with B/L mastectomies

## 2022-11-29 ENCOUNTER — OFFICE VISIT (OUTPATIENT)
Dept: HEMATOLOGY/ONCOLOGY | Facility: CLINIC | Age: 32
End: 2022-11-29
Payer: COMMERCIAL

## 2022-11-29 VITALS
DIASTOLIC BLOOD PRESSURE: 95 MMHG | TEMPERATURE: 99 F | WEIGHT: 149.5 LBS | HEART RATE: 81 BPM | BODY MASS INDEX: 24.91 KG/M2 | HEIGHT: 65 IN | OXYGEN SATURATION: 100 % | SYSTOLIC BLOOD PRESSURE: 135 MMHG

## 2022-11-29 DIAGNOSIS — Z17.0 MALIGNANT NEOPLASM OF OVERLAPPING SITES OF LEFT BREAST IN FEMALE, ESTROGEN RECEPTOR POSITIVE: Primary | ICD-10-CM

## 2022-11-29 DIAGNOSIS — C50.812 MALIGNANT NEOPLASM OF OVERLAPPING SITES OF LEFT BREAST IN FEMALE, ESTROGEN RECEPTOR POSITIVE: Primary | ICD-10-CM

## 2022-11-29 PROCEDURE — 1159F MED LIST DOCD IN RCRD: CPT | Mod: CPTII,S$GLB,, | Performed by: NURSE PRACTITIONER

## 2022-11-29 PROCEDURE — 99999 PR PBB SHADOW E&M-EST. PATIENT-LVL III: ICD-10-PCS | Mod: PBBFAC,,, | Performed by: NURSE PRACTITIONER

## 2022-11-29 PROCEDURE — 99213 OFFICE O/P EST LOW 20 MIN: CPT | Mod: S$GLB,,, | Performed by: NURSE PRACTITIONER

## 2022-11-29 PROCEDURE — 1159F PR MEDICATION LIST DOCUMENTED IN MEDICAL RECORD: ICD-10-PCS | Mod: CPTII,S$GLB,, | Performed by: NURSE PRACTITIONER

## 2022-11-29 PROCEDURE — 3008F PR BODY MASS INDEX (BMI) DOCUMENTED: ICD-10-PCS | Mod: CPTII,S$GLB,, | Performed by: NURSE PRACTITIONER

## 2022-11-29 PROCEDURE — 3075F SYST BP GE 130 - 139MM HG: CPT | Mod: CPTII,S$GLB,, | Performed by: NURSE PRACTITIONER

## 2022-11-29 PROCEDURE — 3080F DIAST BP >= 90 MM HG: CPT | Mod: CPTII,S$GLB,, | Performed by: NURSE PRACTITIONER

## 2022-11-29 PROCEDURE — 3075F PR MOST RECENT SYSTOLIC BLOOD PRESS GE 130-139MM HG: ICD-10-PCS | Mod: CPTII,S$GLB,, | Performed by: NURSE PRACTITIONER

## 2022-11-29 PROCEDURE — 3080F PR MOST RECENT DIASTOLIC BLOOD PRESSURE >= 90 MM HG: ICD-10-PCS | Mod: CPTII,S$GLB,, | Performed by: NURSE PRACTITIONER

## 2022-11-29 PROCEDURE — 3008F BODY MASS INDEX DOCD: CPT | Mod: CPTII,S$GLB,, | Performed by: NURSE PRACTITIONER

## 2022-11-29 PROCEDURE — 99213 PR OFFICE/OUTPT VISIT, EST, LEVL III, 20-29 MIN: ICD-10-PCS | Mod: S$GLB,,, | Performed by: NURSE PRACTITIONER

## 2022-11-29 PROCEDURE — 99999 PR PBB SHADOW E&M-EST. PATIENT-LVL III: CPT | Mod: PBBFAC,,, | Performed by: NURSE PRACTITIONER

## 2022-11-29 NOTE — PROGRESS NOTES
Heme/Onc Progress Note    PATIENT: Radha Yen  MRN: [id number]  DATE: 11/29/2022  Chief Complaint: Follow-up (6 month f/u)    Current Treatment: Tamoxifen    Oncology History   Malignant neoplasm of overlapping sites of left breast in female, estrogen receptor positive   2/12/2020 Initial Diagnosis    Malignant neoplasm of overlapping sites of left breast  Multifocal     2/13/2020 Genetic Testing    [employer] genetic panel negative     3/6/2020 Surgery    3/6/2020: Left simple mastectomy: Multifocal               A.  Invasive ductal carcinoma 2 cm grade 2  [id number]              B.  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 Right simple mastectomy --sentinel lymph node biopsy negative for disease.  Final pathology revealed [id number] ed pathological anatomic stage IA / prognostic stage IA (pT1c(mf) pN1a M0) left breast INVASIVE DUCTAL CARCINOMA, MULTIFOCAL (X2)              3:00 O'CLOCK 6 CMFN: INVASIVE DUCTAL CARCINOMA, GRADE 2 (2.0 CM              3:00 O'CLOCK 3 CMFN: INVASIVE MUCINOUS CARCINOMA, GRADE 1 (0.2 CM.)              DUCTAL CARCINOMA IN SITU, LOW GRADE CRIBRIFORM TYPE, MULTIFOCAL (X3)              INVASIVE CARCINOMA AND DCIS ARE 1.0 CM FROM THE ANTERIOR/SKIN MARGIN.               ONE OF 23 LYMPH NODES POSITIVE FOR METASTATIC CARCINOMA     3/6/2020 Cancer Staged    Staging form: Breast, AJCC 8th Edition  - Pathologic stage from 3/6/2020: Stage IA (pT1c, pN1a, cM0, G2, ER+, ME+, HER2-)       4/15/2020 - 12/23/2020 Hormone Therapy    Zoladex for fertility preservation and treatment  Stopped due to intolerance with Aromasin anxiety     4/23/2020 - 6/24/2020 Chemotherapy      4 cycles of adjuvant chemotherapy Taxotere/Cytoxan      8/6/2020 - 10/28/2020 Hormone Therapy    aromasin --stopped due  to intolerance       1/13/2021 - 11/22/2021 Hormone Therapy    Tamoxifen, 1/13/21,-- 11/22/21 stopped mood swings         1/4/2022 -  Hormone Therapy    Tamoxifen 5 mg b.i.d.         11/29/2022  Patient presents for 6 month follow up. She is on low dose tamoxifen 5mg BID. She is tolerating well overall. She continues to have some fatigue. She finds it hard to lose weight. She denies headache, new bone or joint pain, abdominal pain. She continues to have a monthly cycle, although light and only a day or so. No DUB.       Past Medical History:   Diagnosis Date    Anxiety disorder, unspecified     Breast cancer     Depression     Twin birth     Weight increase         Current Outpatient Medications:     propranoloL (INDERAL LA) 60 MG 24 hr capsule, TAKE ONE CAPSULE ONCE DAILY, Disp: , Rfl:     tamoxifen (NOLVADEX) 10 MG Tab, TAKE (1/2) TABLET TWICE DAILY.., Disp: 60 tablet, Rfl: 1     Review of Systems:   Pertinent positives and negatives included in the HPI. Otherwise a complete review of   systems is negative.  Review of Systems       Objective:     Vitals:    11/29/22 1337   BP: (!) 135/95   Pulse: 81   Temp: 98.5 °F (36.9 °C)         Physical Exam  Constitutional:       Appearance: Normal appearance.   HENT:      Head: Normocephalic and atraumatic.      Nose: Nose normal.      Mouth/Throat:      Mouth: Mucous membranes are moist.   Eyes:      Extraocular Movements: Extraocular movements intact.      Conjunctiva/sclera: Conjunctivae normal.      Pupils: Pupils are equal, round, and reactive to light.   Cardiovascular:      Rate and Rhythm: Normal rate and regular rhythm.      Pulses: Normal pulses.   Pulmonary:      Effort: Pulmonary effort is normal.      Breath sounds: Normal breath sounds.   Abdominal:      General: Bowel sounds are normal.      Palpations: Abdomen is soft.   Musculoskeletal:      Cervical back: Normal range of motion and neck supple.   Neurological:      General: No focal deficit present.       Mental Status: She is alert and oriented to person, place, and time. Mental status is at baseline.   Psychiatric:         Mood and Affect: Mood normal.         Behavior: Behavior normal.       ECOG SCORE            Assessment and Plan     Problem List Items Addressed This Visit          Oncology    Malignant neoplasm of overlapping sites of left breast in female, estrogen receptor positive - Primary    Current Assessment & Plan     Continue low-dose tamoxifen  Consider extended therapy to 10 years (started hormonal therapy in August of 2020)  No mammograms with B/L mastectomies              Follow up in about 6 months (around 5/29/2023) for labs CBC, CMP and visit.

## 2023-01-04 ENCOUNTER — TELEPHONE (OUTPATIENT)
Dept: HEMATOLOGY/ONCOLOGY | Facility: CLINIC | Age: 33
End: 2023-01-04
Payer: COMMERCIAL

## 2023-01-23 ENCOUNTER — HOSPITAL ENCOUNTER (OUTPATIENT)
Dept: RADIOLOGY | Facility: HOSPITAL | Age: 33
Discharge: HOME | End: 2023-01-23
Attending: FAMILY MEDICINE
Payer: COMMERCIAL

## 2023-01-23 DIAGNOSIS — R10.84 GENERALIZED ABDOMINAL PAIN: ICD-10-CM

## 2023-01-23 PROCEDURE — 74018 RADEX ABDOMEN 1 VIEW: CPT | Mod: TC

## 2023-01-27 ENCOUNTER — DOCUMENTATION ONLY (OUTPATIENT)
Dept: INTERNAL MEDICINE | Facility: CLINIC | Age: 33
End: 2023-01-27
Payer: COMMERCIAL

## 2023-05-26 DIAGNOSIS — Z17.0 MALIGNANT NEOPLASM OF OVERLAPPING SITES OF LEFT BREAST IN FEMALE, ESTROGEN RECEPTOR POSITIVE: Primary | ICD-10-CM

## 2023-05-26 DIAGNOSIS — C50.812 MALIGNANT NEOPLASM OF OVERLAPPING SITES OF LEFT BREAST IN FEMALE, ESTROGEN RECEPTOR POSITIVE: Primary | ICD-10-CM

## 2023-05-29 ENCOUNTER — LAB VISIT (OUTPATIENT)
Dept: LAB | Facility: HOSPITAL | Age: 33
End: 2023-05-29
Attending: FAMILY MEDICINE
Payer: COMMERCIAL

## 2023-05-29 DIAGNOSIS — C50.812 MALIGNANT NEOPLASM OF OVERLAPPING SITES OF LEFT BREAST IN FEMALE, ESTROGEN RECEPTOR POSITIVE: ICD-10-CM

## 2023-05-29 DIAGNOSIS — Z17.0 MALIGNANT NEOPLASM OF OVERLAPPING SITES OF LEFT BREAST IN FEMALE, ESTROGEN RECEPTOR POSITIVE: ICD-10-CM

## 2023-05-29 NOTE — PROGRESS NOTES
Heme/Onc Progress Note    PATIENT: Radha eYn  MRN: [id number]  DATE: 6/5/2023  Chief Complaint: Follow-up (6 month f/u)      Current Treatment: Tamoxifen    Oncology History   Malignant neoplasm of overlapping sites of left breast in female, estrogen receptor positive   2/12/2020 Initial Diagnosis    Malignant neoplasm of overlapping sites of left breast  Multifocal     2/13/2020 Genetic Testing    [employer] genetic panel negative     3/6/2020 Surgery    3/6/2020: Left simple mastectomy: Multifocal               A.  Invasive ductal carcinoma 2 cm grade 2  [id number]              B.  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 Right simple mastectomy --sentinel lymph node biopsy negative for disease.  Final pathology revealed [id number] ed pathological anatomic stage IA / prognostic stage IA (pT1c(mf) pN1a M0) left breast INVASIVE DUCTAL CARCINOMA, MULTIFOCAL (X2)              3:00 O'CLOCK 6 CMFN: INVASIVE DUCTAL CARCINOMA, GRADE 2 (2.0 CM              3:00 O'CLOCK 3 CMFN: INVASIVE MUCINOUS CARCINOMA, GRADE 1 (0.2 CM.)              DUCTAL CARCINOMA IN SITU, LOW GRADE CRIBRIFORM TYPE, MULTIFOCAL (X3)              INVASIVE CARCINOMA AND DCIS ARE 1.0 CM FROM THE ANTERIOR/SKIN MARGIN.               ONE OF 23 LYMPH NODES POSITIVE FOR METASTATIC CARCINOMA     3/6/2020 Cancer Staged    Staging form: Breast, AJCC 8th Edition  - Pathologic stage from 3/6/2020: Stage IA (pT1c, pN1a, cM0, G2, ER+, RI+, HER2-)     4/15/2020 - 12/23/2020 Hormone Therapy    Zoladex for fertility preservation and treatment  Stopped due to intolerance with Aromasin anxiety     4/23/2020 - 6/24/2020 Chemotherapy      4 cycles of adjuvant chemotherapy Taxotere/Cytoxan      8/6/2020 - 10/28/2020 Hormone Therapy    aromasin --stopped due  to intolerance       2021 - 2021 Hormone Therapy    Tamoxifen, 21,-- 21 stopped mood swings         2022 -  Hormone Therapy    Tamoxifen 5 mg b.i.d.         2023  Patient presents for 6 month follow up for breast cancer. She is doing well. She did have recent abd. Ultrasound with gynecologist 2 weeks ago, diagnosed with ovarian cyst. She continues to have regular menstrual cycles. Lasting one day. Her father  suddenly in February this year. She is still grieving his loss, however, overall doing well.     Past Medical History:   Diagnosis Date    Anxiety disorder, unspecified     Breast cancer     Depression     Twin birth     Weight increase         Current Outpatient Medications:     propranoloL (INDERAL LA) 60 MG 24 hr capsule, TAKE ONE CAPSULE ONCE DAILY, Disp: , Rfl:     sertraline (ZOLOFT) 50 MG tablet, take 1 tablet (50 mg) by oral route once daily, Disp: , Rfl:     tamoxifen (NOLVADEX) 10 MG Tab, TAKE (1/2) TABLET TWICE DAILY.., Disp: 60 tablet, Rfl: 1     Review of Systems:   Pertinent positives and negatives included in the HPI. Otherwise a complete review of   systems is negative.  Review of Systems       Objective:     Vitals:    23 1513   BP: (!) 138/92   Pulse: 77   Temp: 98.4 °F (36.9 °C)           Physical Exam  Constitutional:       Appearance: Normal appearance.   HENT:      Head: Normocephalic and atraumatic.      Nose: Nose normal.      Mouth/Throat:      Mouth: Mucous membranes are moist.   Eyes:      Extraocular Movements: Extraocular movements intact.      Conjunctiva/sclera: Conjunctivae normal.      Pupils: Pupils are equal, round, and reactive to light.   Cardiovascular:      Rate and Rhythm: Normal rate and regular rhythm.      Pulses: Normal pulses.   Pulmonary:      Effort: Pulmonary effort is normal.      Breath sounds: Normal breath sounds.   Chest:      Comments: Chest wall exam performed 2 weeks ago by gyn  Abdominal:      General: Bowel  sounds are normal.      Palpations: Abdomen is soft.   Musculoskeletal:      Cervical back: Normal range of motion and neck supple.   Neurological:      General: No focal deficit present.      Mental Status: She is alert and oriented to person, place, and time. Mental status is at baseline.   Psychiatric:         Mood and Affect: Mood normal.         Behavior: Behavior normal.       ECOG SCORE            Assessment and Plan   Malignant neoplasm of overlapping sites of left breast in female, estrogen receptor positive      Plan:  Continue low-dose tamoxifen  Consider extended therapy to 10 years (started hormonal therapy in August of 2020)  No mammograms with B/L mastectomies      Follow up in about 6 months (around 12/5/2023) for OV with Jennifer, labs prior.

## 2023-06-05 ENCOUNTER — OFFICE VISIT (OUTPATIENT)
Dept: HEMATOLOGY/ONCOLOGY | Facility: CLINIC | Age: 33
End: 2023-06-05
Payer: COMMERCIAL

## 2023-06-05 VITALS
HEART RATE: 77 BPM | OXYGEN SATURATION: 98 % | DIASTOLIC BLOOD PRESSURE: 92 MMHG | HEIGHT: 65 IN | WEIGHT: 141.13 LBS | BODY MASS INDEX: 23.52 KG/M2 | SYSTOLIC BLOOD PRESSURE: 138 MMHG | TEMPERATURE: 98 F

## 2023-06-05 DIAGNOSIS — C50.812 MALIGNANT NEOPLASM OF OVERLAPPING SITES OF LEFT BREAST IN FEMALE, ESTROGEN RECEPTOR POSITIVE: Primary | ICD-10-CM

## 2023-06-05 DIAGNOSIS — Z17.0 MALIGNANT NEOPLASM OF OVERLAPPING SITES OF LEFT BREAST IN FEMALE, ESTROGEN RECEPTOR POSITIVE: Primary | ICD-10-CM

## 2023-06-05 PROCEDURE — 3008F BODY MASS INDEX DOCD: CPT | Mod: CPTII,S$GLB,, | Performed by: NURSE PRACTITIONER

## 2023-06-05 PROCEDURE — 3080F PR MOST RECENT DIASTOLIC BLOOD PRESSURE >= 90 MM HG: ICD-10-PCS | Mod: CPTII,S$GLB,, | Performed by: NURSE PRACTITIONER

## 2023-06-05 PROCEDURE — 3080F DIAST BP >= 90 MM HG: CPT | Mod: CPTII,S$GLB,, | Performed by: NURSE PRACTITIONER

## 2023-06-05 PROCEDURE — 1159F PR MEDICATION LIST DOCUMENTED IN MEDICAL RECORD: ICD-10-PCS | Mod: CPTII,S$GLB,, | Performed by: NURSE PRACTITIONER

## 2023-06-05 PROCEDURE — 3075F PR MOST RECENT SYSTOLIC BLOOD PRESS GE 130-139MM HG: ICD-10-PCS | Mod: CPTII,S$GLB,, | Performed by: NURSE PRACTITIONER

## 2023-06-05 PROCEDURE — 99213 PR OFFICE/OUTPT VISIT, EST, LEVL III, 20-29 MIN: ICD-10-PCS | Mod: S$GLB,,, | Performed by: NURSE PRACTITIONER

## 2023-06-05 PROCEDURE — 99999 PR PBB SHADOW E&M-EST. PATIENT-LVL III: ICD-10-PCS | Mod: PBBFAC,,, | Performed by: NURSE PRACTITIONER

## 2023-06-05 PROCEDURE — 1160F RVW MEDS BY RX/DR IN RCRD: CPT | Mod: CPTII,S$GLB,, | Performed by: NURSE PRACTITIONER

## 2023-06-05 PROCEDURE — 3008F PR BODY MASS INDEX (BMI) DOCUMENTED: ICD-10-PCS | Mod: CPTII,S$GLB,, | Performed by: NURSE PRACTITIONER

## 2023-06-05 PROCEDURE — 1159F MED LIST DOCD IN RCRD: CPT | Mod: CPTII,S$GLB,, | Performed by: NURSE PRACTITIONER

## 2023-06-05 PROCEDURE — 3075F SYST BP GE 130 - 139MM HG: CPT | Mod: CPTII,S$GLB,, | Performed by: NURSE PRACTITIONER

## 2023-06-05 PROCEDURE — 99213 OFFICE O/P EST LOW 20 MIN: CPT | Mod: S$GLB,,, | Performed by: NURSE PRACTITIONER

## 2023-06-05 PROCEDURE — 99999 PR PBB SHADOW E&M-EST. PATIENT-LVL III: CPT | Mod: PBBFAC,,, | Performed by: NURSE PRACTITIONER

## 2023-06-05 PROCEDURE — 1160F PR REVIEW ALL MEDS BY PRESCRIBER/CLIN PHARMACIST DOCUMENTED: ICD-10-PCS | Mod: CPTII,S$GLB,, | Performed by: NURSE PRACTITIONER

## 2023-06-05 RX ORDER — SERTRALINE HYDROCHLORIDE 50 MG/1
TABLET, FILM COATED ORAL
COMMUNITY
Start: 2023-05-05 | End: 2023-11-14

## 2023-11-10 ENCOUNTER — LAB VISIT (OUTPATIENT)
Dept: LAB | Facility: HOSPITAL | Age: 33
End: 2023-11-10
Attending: FAMILY MEDICINE
Payer: COMMERCIAL

## 2023-11-10 DIAGNOSIS — Z17.0 MALIGNANT NEOPLASM OF OVERLAPPING SITES OF LEFT BREAST IN FEMALE, ESTROGEN RECEPTOR POSITIVE: ICD-10-CM

## 2023-11-10 DIAGNOSIS — C50.812 MALIGNANT NEOPLASM OF OVERLAPPING SITES OF LEFT BREAST IN FEMALE, ESTROGEN RECEPTOR POSITIVE: ICD-10-CM

## 2023-11-10 LAB
ALBUMIN SERPL-MCNC: 4.5 G/DL (ref 3.5–5)
ALBUMIN/GLOB SERPL: 1.5 RATIO (ref 1.1–2)
ALP SERPL-CCNC: 85 UNIT/L (ref 40–150)
ALT SERPL-CCNC: 13 UNIT/L (ref 0–55)
AST SERPL-CCNC: 17 UNIT/L (ref 5–34)
BASOPHILS # BLD AUTO: 0.04 X10(3)/MCL
BASOPHILS NFR BLD AUTO: 0.4 %
BILIRUB SERPL-MCNC: 0.6 MG/DL
BUN SERPL-MCNC: 8.2 MG/DL (ref 7–18.7)
CALCIUM SERPL-MCNC: 9.7 MG/DL (ref 8.4–10.2)
CHLORIDE SERPL-SCNC: 104 MMOL/L (ref 98–107)
CO2 SERPL-SCNC: 26 MMOL/L (ref 22–29)
CREAT SERPL-MCNC: 0.83 MG/DL (ref 0.55–1.02)
EOSINOPHIL # BLD AUTO: 0.21 X10(3)/MCL (ref 0–0.9)
EOSINOPHIL NFR BLD AUTO: 2 %
ERYTHROCYTE [DISTWIDTH] IN BLOOD BY AUTOMATED COUNT: 12.2 % (ref 11.5–17)
GFR SERPLBLD CREATININE-BSD FMLA CKD-EPI: >60 MLS/MIN/1.73/M2
GLOBULIN SER-MCNC: 3.1 GM/DL (ref 2.4–3.5)
GLUCOSE SERPL-MCNC: 93 MG/DL (ref 74–100)
HCT VFR BLD AUTO: 40.4 % (ref 37–47)
HGB BLD-MCNC: 12.9 G/DL (ref 12–16)
IMM GRANULOCYTES # BLD AUTO: 0.01 X10(3)/MCL (ref 0–0.04)
IMM GRANULOCYTES NFR BLD AUTO: 0.1 %
LYMPHOCYTES # BLD AUTO: 2.22 X10(3)/MCL (ref 0.6–4.6)
LYMPHOCYTES NFR BLD AUTO: 21.6 %
MCH RBC QN AUTO: 29.6 PG (ref 27–31)
MCHC RBC AUTO-ENTMCNC: 31.9 G/DL (ref 33–36)
MCV RBC AUTO: 92.7 FL (ref 80–94)
MONOCYTES # BLD AUTO: 0.78 X10(3)/MCL (ref 0.1–1.3)
MONOCYTES NFR BLD AUTO: 7.6 %
NEUTROPHILS # BLD AUTO: 7.01 X10(3)/MCL (ref 2.1–9.2)
NEUTROPHILS NFR BLD AUTO: 68.3 %
PLATELET # BLD AUTO: 344 X10(3)/MCL (ref 130–400)
PMV BLD AUTO: 9.7 FL (ref 7.4–10.4)
POTASSIUM SERPL-SCNC: 4.5 MMOL/L (ref 3.5–5.1)
PROT SERPL-MCNC: 7.6 GM/DL (ref 6.4–8.3)
RBC # BLD AUTO: 4.36 X10(6)/MCL (ref 4.2–5.4)
SODIUM SERPL-SCNC: 137 MMOL/L (ref 136–145)
WBC # SPEC AUTO: 10.27 X10(3)/MCL (ref 4.5–11.5)

## 2023-11-10 PROCEDURE — 80053 COMPREHEN METABOLIC PANEL: CPT

## 2023-11-10 PROCEDURE — 85025 COMPLETE CBC W/AUTO DIFF WBC: CPT

## 2023-11-10 PROCEDURE — 36415 COLL VENOUS BLD VENIPUNCTURE: CPT

## 2023-11-13 NOTE — PROGRESS NOTES
Heme/Onc Progress Note    PATIENT: Radha Yen  MRN: [id number]  DATE: 11/14/2023  Chief Complaint: Follow-up (Patient is here for her 6 month visit, has questions regarding taking a higher dose of Zoloft and taking the Tamoxifen)      Current Treatment: Tamoxifen    Oncology History   Malignant neoplasm of overlapping sites of left breast in female, estrogen receptor positive   2/12/2020 Initial Diagnosis    Malignant neoplasm of overlapping sites of left breast  Multifocal     2/13/2020 Genetic Testing    [employer] genetic panel negative     3/6/2020 Surgery    3/6/2020: Left simple mastectomy: Multifocal               A.  Invasive ductal carcinoma 2 cm grade 2  [id number]              B.  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 Right simple mastectomy --sentinel lymph node biopsy negative for disease.  Final pathology revealed [id number] ed pathological anatomic stage IA / prognostic stage IA (pT1c(mf) pN1a M0) left breast INVASIVE DUCTAL CARCINOMA, MULTIFOCAL (X2)              3:00 O'CLOCK 6 CMFN: INVASIVE DUCTAL CARCINOMA, GRADE 2 (2.0 CM              3:00 O'CLOCK 3 CMFN: INVASIVE MUCINOUS CARCINOMA, GRADE 1 (0.2 CM.)              DUCTAL CARCINOMA IN SITU, LOW GRADE CRIBRIFORM TYPE, MULTIFOCAL (X3)              INVASIVE CARCINOMA AND DCIS ARE 1.0 CM FROM THE ANTERIOR/SKIN MARGIN.               ONE OF 23 LYMPH NODES POSITIVE FOR METASTATIC CARCINOMA     3/6/2020 Cancer Staged    Staging form: Breast, AJCC 8th Edition  - Pathologic stage from 3/6/2020: Stage IA (pT1c, pN1a, cM0, G2, ER+, [place]+, HER2-)     4/15/2020 - 12/23/2020 Hormone Therapy    Zoladex for fertility preservation and treatment  Stopped due to intolerance with Aromasin anxiety     4/23/2020 - 6/24/2020 Chemotherapy      4 cycles of  adjuvant chemotherapy Taxotere/Cytoxan      8/6/2020 - 10/28/2020 Hormone Therapy    aromasin --stopped due to intolerance       1/13/2021 - 11/22/2021 Hormone Therapy    Tamoxifen, 1/13/21,-- 11/22/21 stopped mood swings         1/4/2022 -  Hormone Therapy    Tamoxifen 5 mg b.i.d.         11/14/2023  Patient presents for 6 month follow up for breast cancer. She is doing well. She is compliant with tamoxifen. She denies DUB, although she does continue to have a monthly menstrual cycle, albeit light only lasting 2-3 days.  Her PCP has her on Zoloft and she was asked to review interactions with tamoxifen. She has tried and failed several other SSRIs and her bouts of depression were significant in the past, to a debilitating degree, so she would rather not change to different medication.   Past Medical History:   Diagnosis Date    Anxiety disorder, unspecified     Breast cancer     Depression     Twin birth     Weight increase         Current Outpatient Medications:     propranoloL (INDERAL LA) 60 MG 24 hr capsule, TAKE ONE CAPSULE ONCE DAILY, Disp: , Rfl:     sertraline (ZOLOFT) 100 MG tablet, Take 100 mg by mouth., Disp: , Rfl:     tamoxifen (NOLVADEX) 10 MG Tab, TAKE (1/2) TABLET TWICE DAILY.., Disp: 60 tablet, Rfl: 1     Review of Systems:   Pertinent positives and negatives included in the HPI. Otherwise a complete review of   systems is negative.  Review of Systems   Constitutional:  Positive for unexpected weight change. Negative for appetite change.   HENT:  Positive for mouth sores.    Eyes:  Negative for visual disturbance.   Respiratory:  Positive for cough. Negative for shortness of breath.    Cardiovascular:  Negative for chest pain.   Gastrointestinal:  Positive for abdominal pain and diarrhea.   Genitourinary:  Negative for frequency.   Musculoskeletal:  Positive for back pain.   Skin:  Negative for rash.   Neurological:  Positive for headaches.   Hematological:  Negative for adenopathy.    Psychiatric/Behavioral:  The patient is not nervous/anxious.           Objective:     Vitals:    11/14/23 1305   BP: 132/84   Pulse: 69   Temp: 98.2 °F (36.8 °C)           Physical Exam  Constitutional:       Appearance: Normal appearance.   HENT:      Head: Normocephalic and atraumatic.      Nose: Nose normal.      Mouth/Throat:      Mouth: Mucous membranes are moist.   Eyes:      Extraocular Movements: Extraocular movements intact.      Conjunctiva/sclera: Conjunctivae normal.      Pupils: Pupils are equal, round, and reactive to light.   Cardiovascular:      Rate and Rhythm: Normal rate and regular rhythm.      Pulses: Normal pulses.   Pulmonary:      Effort: Pulmonary effort is normal.      Breath sounds: Normal breath sounds.   Chest:      Comments: No axillary LAD  Abdominal:      General: Bowel sounds are normal.      Palpations: Abdomen is soft.   Musculoskeletal:      Cervical back: Normal range of motion and neck supple.   Neurological:      General: No focal deficit present.      Mental Status: She is alert and oriented to person, place, and time. Mental status is at baseline.   Psychiatric:         Mood and Affect: Mood normal.         Behavior: Behavior normal.         ECOG SCORE            Assessment and Plan   Malignant neoplasm of overlapping sites of left breast in female, estrogen receptor positive  CONSTANTINO with depression. She is on zoloft with her PCP. I have reviewed NCCN Guidelines and the SSRIs to avoid are paxil and prozac.       Plan:  Continue low-dose tamoxifen 5mg BID  Consider extended therapy to 10 years (started hormonal therapy in August of 2020)  No mammograms with B/L mastectomies      Follow up in about 6 months (around 5/14/2024) for Labs and OV with Jennifer.

## 2023-11-14 ENCOUNTER — PATIENT MESSAGE (OUTPATIENT)
Dept: HEMATOLOGY/ONCOLOGY | Facility: CLINIC | Age: 33
End: 2023-11-14

## 2023-11-14 ENCOUNTER — OFFICE VISIT (OUTPATIENT)
Dept: HEMATOLOGY/ONCOLOGY | Facility: CLINIC | Age: 33
End: 2023-11-14
Payer: COMMERCIAL

## 2023-11-14 VITALS
BODY MASS INDEX: 22.26 KG/M2 | TEMPERATURE: 98 F | OXYGEN SATURATION: 98 % | DIASTOLIC BLOOD PRESSURE: 84 MMHG | HEIGHT: 65 IN | SYSTOLIC BLOOD PRESSURE: 132 MMHG | WEIGHT: 133.63 LBS | HEART RATE: 69 BPM

## 2023-11-14 DIAGNOSIS — C50.812 MALIGNANT NEOPLASM OF OVERLAPPING SITES OF LEFT BREAST IN FEMALE, ESTROGEN RECEPTOR POSITIVE: Primary | ICD-10-CM

## 2023-11-14 DIAGNOSIS — F33.42 RECURRENT MAJOR DEPRESSIVE DISORDER, IN FULL REMISSION: ICD-10-CM

## 2023-11-14 DIAGNOSIS — Z17.0 MALIGNANT NEOPLASM OF OVERLAPPING SITES OF LEFT BREAST IN FEMALE, ESTROGEN RECEPTOR POSITIVE: Primary | ICD-10-CM

## 2023-11-14 DIAGNOSIS — Z79.810 LONG-TERM CURRENT USE OF TAMOXIFEN: ICD-10-CM

## 2023-11-14 PROCEDURE — 99999 PR PBB SHADOW E&M-EST. PATIENT-LVL III: CPT | Mod: PBBFAC,,, | Performed by: NURSE PRACTITIONER

## 2023-11-14 PROCEDURE — 1160F RVW MEDS BY RX/DR IN RCRD: CPT | Mod: CPTII,S$GLB,, | Performed by: NURSE PRACTITIONER

## 2023-11-14 PROCEDURE — 99999 PR PBB SHADOW E&M-EST. PATIENT-LVL III: ICD-10-PCS | Mod: PBBFAC,,, | Performed by: NURSE PRACTITIONER

## 2023-11-14 PROCEDURE — 3075F SYST BP GE 130 - 139MM HG: CPT | Mod: CPTII,S$GLB,, | Performed by: NURSE PRACTITIONER

## 2023-11-14 PROCEDURE — 1159F PR MEDICATION LIST DOCUMENTED IN MEDICAL RECORD: ICD-10-PCS | Mod: CPTII,S$GLB,, | Performed by: NURSE PRACTITIONER

## 2023-11-14 PROCEDURE — 99214 OFFICE O/P EST MOD 30 MIN: CPT | Mod: S$GLB,,, | Performed by: NURSE PRACTITIONER

## 2023-11-14 PROCEDURE — 1159F MED LIST DOCD IN RCRD: CPT | Mod: CPTII,S$GLB,, | Performed by: NURSE PRACTITIONER

## 2023-11-14 PROCEDURE — 99214 PR OFFICE/OUTPT VISIT, EST, LEVL IV, 30-39 MIN: ICD-10-PCS | Mod: S$GLB,,, | Performed by: NURSE PRACTITIONER

## 2023-11-14 PROCEDURE — 3008F PR BODY MASS INDEX (BMI) DOCUMENTED: ICD-10-PCS | Mod: CPTII,S$GLB,, | Performed by: NURSE PRACTITIONER

## 2023-11-14 PROCEDURE — 3075F PR MOST RECENT SYSTOLIC BLOOD PRESS GE 130-139MM HG: ICD-10-PCS | Mod: CPTII,S$GLB,, | Performed by: NURSE PRACTITIONER

## 2023-11-14 PROCEDURE — 3079F DIAST BP 80-89 MM HG: CPT | Mod: CPTII,S$GLB,, | Performed by: NURSE PRACTITIONER

## 2023-11-14 PROCEDURE — 3008F BODY MASS INDEX DOCD: CPT | Mod: CPTII,S$GLB,, | Performed by: NURSE PRACTITIONER

## 2023-11-14 PROCEDURE — 3079F PR MOST RECENT DIASTOLIC BLOOD PRESSURE 80-89 MM HG: ICD-10-PCS | Mod: CPTII,S$GLB,, | Performed by: NURSE PRACTITIONER

## 2023-11-14 PROCEDURE — 1160F PR REVIEW ALL MEDS BY PRESCRIBER/CLIN PHARMACIST DOCUMENTED: ICD-10-PCS | Mod: CPTII,S$GLB,, | Performed by: NURSE PRACTITIONER

## 2023-11-14 RX ORDER — SERTRALINE HYDROCHLORIDE 100 MG/1
100 TABLET, FILM COATED ORAL
COMMUNITY
Start: 2023-11-07

## 2024-05-15 NOTE — PROGRESS NOTES
Heme/Onc Progress Note    PATIENT: Radha Yen  MRN: [id number]  DATE: 5/21/2024  Chief Complaint: Follow-up (Patient is here for her 6 month visit)      Current Treatment: Tamoxifen    Oncology History   Malignant neoplasm of overlapping sites of left breast in female, estrogen receptor positive   2/12/2020 Initial Diagnosis    Malignant neoplasm of overlapping sites of left breast  Multifocal     2/13/2020 Genetic Testing    [employer] genetic panel negative     3/6/2020 Surgery    3/6/2020: Left simple mastectomy: Multifocal               A.  Invasive ductal carcinoma 2 cm grade 2  [id number]              B.  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 Right simple mastectomy --sentinel lymph node biopsy negative for disease.  Final pathology revealed [id number] ed pathological anatomic stage IA / prognostic stage IA (pT1c(mf) pN1a M0) left breast INVASIVE DUCTAL CARCINOMA, MULTIFOCAL (X2)              3:00 O'CLOCK 6 CMFN: INVASIVE DUCTAL CARCINOMA, GRADE 2 (2.0 CM              3:00 O'CLOCK 3 CMFN: INVASIVE MUCINOUS CARCINOMA, GRADE 1 (0.2 CM.)              DUCTAL CARCINOMA IN SITU, LOW GRADE CRIBRIFORM TYPE, MULTIFOCAL (X3)              INVASIVE CARCINOMA AND DCIS ARE 1.0 CM FROM THE ANTERIOR/SKIN MARGIN.               ONE OF 23 LYMPH NODES POSITIVE FOR METASTATIC CARCINOMA     3/6/2020 Cancer Staged    Staging form: Breast, AJCC 8th Edition  - Pathologic stage from 3/6/2020: Stage IA (pT1c, pN1a, cM0, G2, ER+, [place]+, HER2-)     4/15/2020 - 12/23/2020 Hormone Therapy    Zoladex for fertility preservation and treatment  Stopped due to intolerance with Aromasin anxiety     4/23/2020 - 6/24/2020 Chemotherapy      4 cycles of adjuvant chemotherapy Taxotere/Cytoxan      8/6/2020 - 10/28/2020 Hormone Therapy     aromasin --stopped due to intolerance       1/13/2021 - 11/22/2021 Hormone Therapy    Tamoxifen, 1/13/21,-- 11/22/21 stopped mood swings         1/4/2022 -  Hormone Therapy    Tamoxifen 5 mg b.i.d.         05/21/2024  Patient presents for 6 month follow up for breast cancer. She is doing well. She is compliant with tamoxifen. She denies DUB, although she does continue to have a monthly menstrual cycle, albeit light only lasting 1 day at most.   Her bouts of depression were significant in the past, to a debilitating degree. She feels this is controlled now. She is off of her Zoloft and continues with counseling.   She denies any new pain, headaches, weight loss, night sweats, bone pain, abdominal pain and SOB.  Past Medical History:   Diagnosis Date    Anxiety disorder, unspecified     Breast cancer     Depression     Twin birth     Weight increase         Current Outpatient Medications:     propranoloL (INDERAL LA) 60 MG 24 hr capsule, TAKE ONE CAPSULE ONCE DAILY, Disp: , Rfl:     tamoxifen (NOLVADEX) 10 MG Tab, TAKE (1/2) TABLET TWICE DAILY.., Disp: 60 tablet, Rfl: 1     Review of Systems:   Pertinent positives and negatives included in the HPI. Otherwise a complete review of   systems is negative.  Review of Systems       Objective:     Vitals:    05/21/24 1411   BP: 117/81   Pulse: 71   Temp: 98.4 °F (36.9 °C)             Physical Exam  Constitutional:       Appearance: Normal appearance.   HENT:      Head: Normocephalic and atraumatic.      Nose: Nose normal.      Mouth/Throat:      Mouth: Mucous membranes are moist.   Eyes:      Extraocular Movements: Extraocular movements intact.      Conjunctiva/sclera: Conjunctivae normal.      Pupils: Pupils are equal, round, and reactive to light.   Cardiovascular:      Rate and Rhythm: Normal rate and regular rhythm.      Pulses: Normal pulses.   Pulmonary:      Effort: Pulmonary effort is normal.      Breath sounds: Normal breath sounds.   Chest:      Comments: No  axillary LAD  Abdominal:      General: Bowel sounds are normal.      Palpations: Abdomen is soft.   Musculoskeletal:      Cervical back: Normal range of motion and neck supple.   Neurological:      General: No focal deficit present.      Mental Status: She is alert and oriented to person, place, and time. Mental status is at baseline.   Psychiatric:         Mood and Affect: Mood normal.         Behavior: Behavior normal.         ECOG SCORE            Assessment and Plan   Malignant neoplasm of overlapping sites of left breast in female, estrogen receptor positive  CONSTANTINO with depression. She is now off of zoloft.     Plan:  Continue low-dose tamoxifen 5mg BID  Consider extended therapy to 10 years (started hormonal therapy in August of 2020)  No mammograms with B/L mastectomies      Follow up in about 6 months (around 11/21/2024) for CBC, CMP, and OV.

## 2024-05-16 DIAGNOSIS — Z17.0 MALIGNANT NEOPLASM OF OVERLAPPING SITES OF LEFT BREAST IN FEMALE, ESTROGEN RECEPTOR POSITIVE: Primary | ICD-10-CM

## 2024-05-16 DIAGNOSIS — C50.812 MALIGNANT NEOPLASM OF OVERLAPPING SITES OF LEFT BREAST IN FEMALE, ESTROGEN RECEPTOR POSITIVE: Primary | ICD-10-CM

## 2024-05-21 ENCOUNTER — OFFICE VISIT (OUTPATIENT)
Dept: HEMATOLOGY/ONCOLOGY | Facility: CLINIC | Age: 34
End: 2024-05-21
Payer: COMMERCIAL

## 2024-05-21 VITALS
WEIGHT: 136.44 LBS | TEMPERATURE: 98 F | SYSTOLIC BLOOD PRESSURE: 117 MMHG | BODY MASS INDEX: 22.73 KG/M2 | HEIGHT: 65 IN | DIASTOLIC BLOOD PRESSURE: 81 MMHG | HEART RATE: 71 BPM | OXYGEN SATURATION: 100 %

## 2024-05-21 DIAGNOSIS — F33.42 RECURRENT MAJOR DEPRESSIVE DISORDER, IN FULL REMISSION: ICD-10-CM

## 2024-05-21 DIAGNOSIS — C50.812 MALIGNANT NEOPLASM OF OVERLAPPING SITES OF LEFT BREAST IN FEMALE, ESTROGEN RECEPTOR POSITIVE: Primary | ICD-10-CM

## 2024-05-21 DIAGNOSIS — Z79.810 LONG-TERM CURRENT USE OF TAMOXIFEN: ICD-10-CM

## 2024-05-21 DIAGNOSIS — Z17.0 MALIGNANT NEOPLASM OF OVERLAPPING SITES OF LEFT BREAST IN FEMALE, ESTROGEN RECEPTOR POSITIVE: Primary | ICD-10-CM

## 2024-05-21 PROCEDURE — 99999 PR PBB SHADOW E&M-EST. PATIENT-LVL III: CPT | Mod: PBBFAC,,, | Performed by: NURSE PRACTITIONER

## 2024-05-21 PROCEDURE — 99214 OFFICE O/P EST MOD 30 MIN: CPT | Mod: S$GLB,,, | Performed by: NURSE PRACTITIONER

## 2024-05-21 PROCEDURE — 1160F RVW MEDS BY RX/DR IN RCRD: CPT | Mod: CPTII,S$GLB,, | Performed by: NURSE PRACTITIONER

## 2024-05-21 PROCEDURE — 3074F SYST BP LT 130 MM HG: CPT | Mod: CPTII,S$GLB,, | Performed by: NURSE PRACTITIONER

## 2024-05-21 PROCEDURE — 3079F DIAST BP 80-89 MM HG: CPT | Mod: CPTII,S$GLB,, | Performed by: NURSE PRACTITIONER

## 2024-05-21 PROCEDURE — 1159F MED LIST DOCD IN RCRD: CPT | Mod: CPTII,S$GLB,, | Performed by: NURSE PRACTITIONER

## 2024-05-21 PROCEDURE — 3008F BODY MASS INDEX DOCD: CPT | Mod: CPTII,S$GLB,, | Performed by: NURSE PRACTITIONER

## 2024-11-19 DIAGNOSIS — Z17.0 MALIGNANT NEOPLASM OF OVERLAPPING SITES OF LEFT BREAST IN FEMALE, ESTROGEN RECEPTOR POSITIVE: Primary | ICD-10-CM

## 2024-11-19 DIAGNOSIS — C50.812 MALIGNANT NEOPLASM OF OVERLAPPING SITES OF LEFT BREAST IN FEMALE, ESTROGEN RECEPTOR POSITIVE: Primary | ICD-10-CM

## 2024-11-21 ENCOUNTER — LAB VISIT (OUTPATIENT)
Dept: LAB | Facility: HOSPITAL | Age: 34
End: 2024-11-21
Attending: INTERNAL MEDICINE
Payer: COMMERCIAL

## 2024-11-21 ENCOUNTER — OFFICE VISIT (OUTPATIENT)
Dept: HEMATOLOGY/ONCOLOGY | Facility: CLINIC | Age: 34
End: 2024-11-21
Payer: COMMERCIAL

## 2024-11-21 VITALS
RESPIRATION RATE: 18 BRPM | OXYGEN SATURATION: 98 % | TEMPERATURE: 98 F | HEIGHT: 65 IN | HEART RATE: 86 BPM | WEIGHT: 142.81 LBS | DIASTOLIC BLOOD PRESSURE: 76 MMHG | SYSTOLIC BLOOD PRESSURE: 116 MMHG | BODY MASS INDEX: 23.79 KG/M2

## 2024-11-21 DIAGNOSIS — Z17.0 MALIGNANT NEOPLASM OF OVERLAPPING SITES OF LEFT BREAST IN FEMALE, ESTROGEN RECEPTOR POSITIVE: ICD-10-CM

## 2024-11-21 DIAGNOSIS — C50.812 MALIGNANT NEOPLASM OF OVERLAPPING SITES OF LEFT BREAST IN FEMALE, ESTROGEN RECEPTOR POSITIVE: ICD-10-CM

## 2024-11-21 DIAGNOSIS — C50.812 MALIGNANT NEOPLASM OF OVERLAPPING SITES OF LEFT BREAST IN FEMALE, ESTROGEN RECEPTOR POSITIVE: Primary | ICD-10-CM

## 2024-11-21 DIAGNOSIS — F33.42 RECURRENT MAJOR DEPRESSIVE DISORDER, IN FULL REMISSION: ICD-10-CM

## 2024-11-21 DIAGNOSIS — Z17.0 MALIGNANT NEOPLASM OF OVERLAPPING SITES OF LEFT BREAST IN FEMALE, ESTROGEN RECEPTOR POSITIVE: Primary | ICD-10-CM

## 2024-11-21 DIAGNOSIS — Z79.810 LONG-TERM CURRENT USE OF TAMOXIFEN: ICD-10-CM

## 2024-11-21 LAB
ALBUMIN SERPL-MCNC: 4.1 G/DL (ref 3.5–5)
ALBUMIN/GLOB SERPL: 1.4 RATIO (ref 1.1–2)
ALP SERPL-CCNC: 68 UNIT/L (ref 40–150)
ALT SERPL-CCNC: 23 UNIT/L (ref 0–55)
ANION GAP SERPL CALC-SCNC: 8 MEQ/L
AST SERPL-CCNC: 25 UNIT/L (ref 5–34)
BASOPHILS # BLD AUTO: 0.02 X10(3)/MCL
BASOPHILS NFR BLD AUTO: 0.3 %
BILIRUB SERPL-MCNC: 0.3 MG/DL
BUN SERPL-MCNC: 8.8 MG/DL (ref 7–18.7)
CALCIUM SERPL-MCNC: 9.4 MG/DL (ref 8.4–10.2)
CHLORIDE SERPL-SCNC: 105 MMOL/L (ref 98–107)
CO2 SERPL-SCNC: 25 MMOL/L (ref 22–29)
CREAT SERPL-MCNC: 0.88 MG/DL (ref 0.55–1.02)
CREAT/UREA NIT SERPL: 10
EOSINOPHIL # BLD AUTO: 0.19 X10(3)/MCL (ref 0–0.9)
EOSINOPHIL NFR BLD AUTO: 2.4 %
ERYTHROCYTE [DISTWIDTH] IN BLOOD BY AUTOMATED COUNT: 12.1 % (ref 11.5–17)
GFR SERPLBLD CREATININE-BSD FMLA CKD-EPI: >60 ML/MIN/1.73/M2
GLOBULIN SER-MCNC: 3 GM/DL (ref 2.4–3.5)
GLUCOSE SERPL-MCNC: 98 MG/DL (ref 74–100)
HCT VFR BLD AUTO: 36 % (ref 37–47)
HGB BLD-MCNC: 12.2 G/DL (ref 12–16)
IMM GRANULOCYTES # BLD AUTO: 0.02 X10(3)/MCL (ref 0–0.04)
IMM GRANULOCYTES NFR BLD AUTO: 0.3 %
LYMPHOCYTES # BLD AUTO: 2.3 X10(3)/MCL (ref 0.6–4.6)
LYMPHOCYTES NFR BLD AUTO: 29 %
MCH RBC QN AUTO: 30.8 PG (ref 27–31)
MCHC RBC AUTO-ENTMCNC: 33.9 G/DL (ref 33–36)
MCV RBC AUTO: 90.9 FL (ref 80–94)
MONOCYTES # BLD AUTO: 0.6 X10(3)/MCL (ref 0.1–1.3)
MONOCYTES NFR BLD AUTO: 7.6 %
NEUTROPHILS # BLD AUTO: 4.81 X10(3)/MCL (ref 2.1–9.2)
NEUTROPHILS NFR BLD AUTO: 60.4 %
PLATELET # BLD AUTO: 350 X10(3)/MCL (ref 130–400)
PMV BLD AUTO: 9.7 FL (ref 7.4–10.4)
POTASSIUM SERPL-SCNC: 3.8 MMOL/L (ref 3.5–5.1)
PROT SERPL-MCNC: 7.1 GM/DL (ref 6.4–8.3)
RBC # BLD AUTO: 3.96 X10(6)/MCL (ref 4.2–5.4)
SODIUM SERPL-SCNC: 138 MMOL/L (ref 136–145)
WBC # BLD AUTO: 7.94 X10(3)/MCL (ref 4.5–11.5)

## 2024-11-21 PROCEDURE — 3074F SYST BP LT 130 MM HG: CPT | Mod: CPTII,S$GLB,, | Performed by: NURSE PRACTITIONER

## 2024-11-21 PROCEDURE — 99214 OFFICE O/P EST MOD 30 MIN: CPT | Mod: S$GLB,,, | Performed by: NURSE PRACTITIONER

## 2024-11-21 PROCEDURE — 1160F RVW MEDS BY RX/DR IN RCRD: CPT | Mod: CPTII,S$GLB,, | Performed by: NURSE PRACTITIONER

## 2024-11-21 PROCEDURE — 36415 COLL VENOUS BLD VENIPUNCTURE: CPT

## 2024-11-21 PROCEDURE — 3008F BODY MASS INDEX DOCD: CPT | Mod: CPTII,S$GLB,, | Performed by: NURSE PRACTITIONER

## 2024-11-21 PROCEDURE — 80053 COMPREHEN METABOLIC PANEL: CPT

## 2024-11-21 PROCEDURE — 1159F MED LIST DOCD IN RCRD: CPT | Mod: CPTII,S$GLB,, | Performed by: NURSE PRACTITIONER

## 2024-11-21 PROCEDURE — 3078F DIAST BP <80 MM HG: CPT | Mod: CPTII,S$GLB,, | Performed by: NURSE PRACTITIONER

## 2024-11-21 PROCEDURE — 99999 PR PBB SHADOW E&M-EST. PATIENT-LVL IV: CPT | Mod: PBBFAC,,, | Performed by: NURSE PRACTITIONER

## 2024-11-21 PROCEDURE — 85025 COMPLETE CBC W/AUTO DIFF WBC: CPT

## 2024-11-21 NOTE — PROGRESS NOTES
Heme/Onc Progress Note    PATIENT: Radha Yen  MRN: [id number]  DATE: 11/21/2024  Chief Complaint: 6m f/u      Current Treatment: Tamoxifen    Oncology History   Malignant neoplasm of overlapping sites of left breast in female, estrogen receptor positive   2/12/2020 Initial Diagnosis    Malignant neoplasm of overlapping sites of left breast  Multifocal     2/13/2020 Genetic Testing    [employer] genetic panel negative     3/6/2020 Surgery    3/6/2020: Left simple mastectomy: Multifocal               A.  Invasive ductal carcinoma 2 cm grade 2  [id number]              B.  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 Right simple mastectomy --sentinel lymph node biopsy negative for disease.  Final pathology revealed [id number] ed pathological anatomic stage IA / prognostic stage IA (pT1c(mf) pN1a M0) left breast INVASIVE DUCTAL CARCINOMA, MULTIFOCAL (X2)              3:00 O'CLOCK 6 CMFN: INVASIVE DUCTAL CARCINOMA, GRADE 2 (2.0 CM              3:00 O'CLOCK 3 CMFN: INVASIVE MUCINOUS CARCINOMA, GRADE 1 (0.2 CM.)              DUCTAL CARCINOMA IN SITU, LOW GRADE CRIBRIFORM TYPE, MULTIFOCAL (X3)              INVASIVE CARCINOMA AND DCIS ARE 1.0 CM FROM THE ANTERIOR/SKIN MARGIN.               ONE OF 23 LYMPH NODES POSITIVE FOR METASTATIC CARCINOMA     3/6/2020 Cancer Staged    Staging form: Breast, AJCC 8th Edition  - Pathologic stage from 3/6/2020: Stage IA (pT1c, pN1a, cM0, G2, ER+, [place]+, HER2-)     4/15/2020 - 12/23/2020 Hormone Therapy    Zoladex for fertility preservation and treatment  Stopped due to intolerance with Aromasin anxiety     4/23/2020 - 6/24/2020 Chemotherapy      4 cycles of adjuvant chemotherapy Taxotere/Cytoxan      8/6/2020 - 10/28/2020 Hormone Therapy    aromasin --stopped due to  intolerance       1/13/2021 - 11/22/2021 Hormone Therapy    Tamoxifen, 1/13/21,-- 11/22/21 stopped mood swings         1/4/2022 -  Hormone Therapy    Tamoxifen 5 mg b.i.d.         11/21/2024  Patient presents for 6 month follow up for breast cancer. She is doing well. She is compliant with tamoxifen. She denies DUB, although she does continue to have a monthly menstrual cycle, albeit light only lasting 1 day at most.   Her bouts of depression were significant in the past, to a debilitating degree. She feels this is controlled now. She is off of her Zoloft and continues with counseling.   She denies any new pain, headaches, weight loss, night sweats, bone pain, abdominal pain and SOB.She does have some non-specific itching behind her bilateral knees. No rash  Past Medical History:   Diagnosis Date    Anxiety disorder, unspecified     Breast cancer     Depression     Twin birth     Weight increase         Current Outpatient Medications:     propranoloL (INDERAL LA) 60 MG 24 hr capsule, TAKE ONE CAPSULE ONCE DAILY, Disp: , Rfl:     tamoxifen (NOLVADEX) 10 MG Tab, TAKE (1/2) TABLET TWICE DAILY.., Disp: 60 tablet, Rfl: 1     Review of Systems:   Pertinent positives and negatives included in the HPI. Otherwise a complete review of   systems is negative.  Review of Systems   Constitutional:  Positive for appetite change. Negative for unexpected weight change.   HENT:  Negative for mouth sores.    Eyes:  Positive for visual disturbance.   Respiratory:  Positive for shortness of breath. Negative for cough.    Cardiovascular:  Positive for chest pain.   Gastrointestinal:  Positive for abdominal pain and diarrhea.   Genitourinary:  Negative for frequency.   Musculoskeletal:  Negative for back pain.   Skin:  Negative for rash.   Neurological:  Positive for headaches.   Hematological:  Negative for adenopathy.   Psychiatric/Behavioral:  The patient is nervous/anxious.           Objective:     Vitals:    11/21/24 1436   BP:  116/76   Pulse: 86   Resp: 18   Temp: 98 °F (36.7 °C)             Physical Exam  Constitutional:       Appearance: Normal appearance.   HENT:      Head: Normocephalic and atraumatic.      Nose: Nose normal.      Mouth/Throat:      Mouth: Mucous membranes are moist.   Eyes:      Extraocular Movements: Extraocular movements intact.      Conjunctiva/sclera: Conjunctivae normal.      Pupils: Pupils are equal, round, and reactive to light.   Cardiovascular:      Rate and Rhythm: Normal rate and regular rhythm.      Pulses: Normal pulses.   Pulmonary:      Effort: Pulmonary effort is normal.      Breath sounds: Normal breath sounds.   Chest:      Comments: No axillary LAD  Abdominal:      General: Bowel sounds are normal.      Palpations: Abdomen is soft.   Musculoskeletal:      Cervical back: Normal range of motion and neck supple.   Neurological:      General: No focal deficit present.      Mental Status: She is alert and oriented to person, place, and time. Mental status is at baseline.   Psychiatric:         Mood and Affect: Mood normal.         Behavior: Behavior normal.         ECOG SCORE            Assessment and Plan   Malignant neoplasm of overlapping sites of left breast in female, estrogen receptor positive  CONSTANTINO with depression. She is now off of zoloft.     Plan:  Continue low-dose tamoxifen 5mg BID  Consider extended therapy to 10 years (started hormonal therapy in August of 2020)  No mammograms with B/L mastectomies      Follow up in about 6 months (around 5/21/2025) for OV with Dr. Pires, no labs.

## 2025-06-30 NOTE — PROGRESS NOTES
Heme/Onc Progress Note    PATIENT: Radha Yen  MRN: [id number]  DATE: 7/1/2025  Chief Complaint: 6mo f/u      Current Treatment: Tamoxifen stopped by patient    Oncology History   Malignant neoplasm of overlapping sites of left breast in female, estrogen receptor positive   2/12/2020 Initial Diagnosis    Malignant neoplasm of overlapping sites of left breast  Multifocal     2/13/2020 Genetic Testing    [employer] genetic panel negative     3/6/2020 Surgery    3/6/2020: Left simple mastectomy: Multifocal               A.  Invasive ductal carcinoma 2 cm grade 2  [id number]              B.  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 Right simple mastectomy --sentinel lymph node biopsy negative for disease.  Final pathology revealed [id number] ed pathological anatomic stage IA / prognostic stage IA (pT1c(mf) pN1a M0) left breast INVASIVE DUCTAL CARCINOMA, MULTIFOCAL (X2)              3:00 O'CLOCK 6 CMFN: INVASIVE DUCTAL CARCINOMA, GRADE 2 (2.0 CM              3:00 O'CLOCK 3 CMFN: INVASIVE MUCINOUS CARCINOMA, GRADE 1 (0.2 CM.)              DUCTAL CARCINOMA IN SITU, LOW GRADE CRIBRIFORM TYPE, MULTIFOCAL (X3)              INVASIVE CARCINOMA AND DCIS ARE 1.0 CM FROM THE ANTERIOR/SKIN MARGIN.               ONE OF 23 LYMPH NODES POSITIVE FOR METASTATIC CARCINOMA     3/6/2020 Cancer Staged    Staging form: Breast, AJCC 8th Edition  - Pathologic stage from 3/6/2020: Stage IA (pT1c, pN1a, cM0, G2, ER+, OR+, HER2-)     4/15/2020 - 12/23/2020 Hormone Therapy    Zoladex for fertility preservation and treatment  Stopped due to intolerance with Aromasin anxiety     4/23/2020 - 6/24/2020 Chemotherapy      4 cycles of adjuvant chemotherapy Taxotere/Cytoxan      8/6/2020 - 10/28/2020 Hormone Therapy    aromasin --stopped  due to intolerance       1/13/2021 - 11/22/2021 Hormone Therapy    Tamoxifen, 1/13/21,-- 11/22/21 stopped mood swings         1/4/2022 -  Hormone Therapy    Tamoxifen 5 mg b.i.d.         07/01/2025  Patient presents for follow up for breast cancer.   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  She is in good spirits; clean and nourished in appearance.  She has stopped taking tamoxifen.  Recently referred to cardiologist for acid reflux induced by anxiety.   Patient reports acid reflux is trigger after each meal.  Currently being treated for ringworm; spots on stomach and back.  Denies any new pain, headaches, weight loss, night sweats, bone pain, abdominal pain and SOB.  Discussed DX, avoiding hormone tx, importance of proper diet and exercise.  Will refer to [facility] since she is self pay.    Past Medical History:   Diagnosis Date    Anxiety disorder, unspecified     Breast cancer     Depression     Twin birth     Weight increase         Current Outpatient Medications:     omeprazole (PRILOSEC OTC) 20 MG tablet, Take 20 mg by mouth once daily., Disp: , Rfl:     propranoloL (INDERAL LA) 60 MG 24 hr capsule, TAKE ONE CAPSULE ONCE DAILY, Disp: , Rfl:      Review of Systems:   Pertinent positives and negatives included in the HPI. Otherwise a complete review of   systems is negative.  Review of Systems     ---  Objective:     Vitals:    07/01/25 1316   BP: 136/89   Pulse: 88   Resp: 18   Temp: 98.8 °F (37.1 °C)               Physical Exam  Vitals reviewed. Exam conducted with a chaperone present.   Constitutional:       Appearance: Normal appearance.   HENT:      Head: Normocephalic and atraumatic.      Nose: Nose normal.      Mouth/Throat:      Mouth: Mucous membranes are moist.   Eyes:      Extraocular Movements: Extraocular movements intact.      Conjunctiva/sclera: Conjunctivae normal.      Pupils: Pupils are equal, round, and reactive to  light.   Cardiovascular:      Rate and Rhythm: Normal rate and regular rhythm.      Pulses: Normal pulses.   Pulmonary:      Effort: Pulmonary effort is normal.      Breath sounds: Normal breath sounds.   Chest:      Comments: No axillary LAD  Abdominal:      General: Bowel sounds are normal.      Palpations: Abdomen is soft.   Musculoskeletal:      Cervical back: Normal range of motion and neck supple.   Skin:     Comments: A few round areas of healing, concerning for ringworm patient is on treatment   Neurological:      General: No focal deficit present.      Mental Status: She is alert and oriented to person, place, and time. Mental status is at baseline.   Psychiatric:         Mood and Affect: Mood normal.         Behavior: Behavior normal.       ECOG SCORE            Assessment and Plan   Malignant neoplasm of overlapping sites of left breast in female, estrogen receptor positive  CONSTANTINO with depression. She is now off of zoloft.     No evidence of disease   Discussed exercise weight loss low body fat percentage   Discussed extended hormonal therapy   Discussed hysterectomy,   Discussed survivorship      Plan:  Consider extended therapy to 10 years (started hormonal therapy in August of 2020)  No mammograms with B/L mastectomies  Return to clinic in 1 year   Refer to gyn to discuss hysterectomy  I would be okay with this patient returning once a year and a survivorship mode till February of 2030  If the patient decides she wanted to retry endocrine therapy up until 2030 again I would consider resuming  If patient has a hysterectomy may not be any further need.      Follow up in about 1 year (around 7/1/2026) for OV.      I, Alanna Chavez LPN, acted solely as a scribe for and in the presence of, Dr. Nilton Pires who performed the service.    Nilton Pires MD

## 2025-07-01 ENCOUNTER — OFFICE VISIT (OUTPATIENT)
Dept: HEMATOLOGY/ONCOLOGY | Facility: CLINIC | Age: 35
End: 2025-07-01

## 2025-07-01 ENCOUNTER — PATIENT MESSAGE (OUTPATIENT)
Dept: HEMATOLOGY/ONCOLOGY | Facility: CLINIC | Age: 35
End: 2025-07-01

## 2025-07-01 VITALS
TEMPERATURE: 99 F | HEIGHT: 65 IN | SYSTOLIC BLOOD PRESSURE: 136 MMHG | HEART RATE: 88 BPM | WEIGHT: 141 LBS | BODY MASS INDEX: 23.49 KG/M2 | RESPIRATION RATE: 18 BRPM | OXYGEN SATURATION: 100 % | DIASTOLIC BLOOD PRESSURE: 89 MMHG

## 2025-07-01 DIAGNOSIS — Z79.810 LONG-TERM CURRENT USE OF TAMOXIFEN: ICD-10-CM

## 2025-07-01 DIAGNOSIS — C50.812 MALIGNANT NEOPLASM OF OVERLAPPING SITES OF LEFT BREAST IN FEMALE, ESTROGEN RECEPTOR POSITIVE: Primary | ICD-10-CM

## 2025-07-01 DIAGNOSIS — Z17.0 MALIGNANT NEOPLASM OF OVERLAPPING SITES OF LEFT BREAST IN FEMALE, ESTROGEN RECEPTOR POSITIVE: Primary | ICD-10-CM

## 2025-07-01 PROCEDURE — 99213 OFFICE O/P EST LOW 20 MIN: CPT | Mod: S$PBB,,, | Performed by: INTERNAL MEDICINE

## 2025-07-01 PROCEDURE — 99214 OFFICE O/P EST MOD 30 MIN: CPT | Mod: PBBFAC | Performed by: INTERNAL MEDICINE

## 2025-07-01 PROCEDURE — 99999 PR PBB SHADOW E&M-EST. PATIENT-LVL IV: CPT | Mod: PBBFAC,,, | Performed by: INTERNAL MEDICINE

## 2025-07-01 RX ORDER — OMEPRAZOLE 20 MG/1
20 TABLET, DELAYED RELEASE ORAL DAILY
COMMUNITY

## 2025-07-01 RX ORDER — TERBINAFINE HYDROCHLORIDE 250 MG/1
250 TABLET ORAL
COMMUNITY
Start: 2025-06-28 | End: 2025-07-01

## 2025-07-02 DIAGNOSIS — Z90.13 H/O BILATERAL MASTECTOMY: ICD-10-CM

## 2025-07-02 DIAGNOSIS — Z17.0 MALIGNANT NEOPLASM OF OVERLAPPING SITES OF LEFT BREAST IN FEMALE, ESTROGEN RECEPTOR POSITIVE: Primary | ICD-10-CM

## 2025-07-02 DIAGNOSIS — C50.812 MALIGNANT NEOPLASM OF OVERLAPPING SITES OF LEFT BREAST IN FEMALE, ESTROGEN RECEPTOR POSITIVE: Primary | ICD-10-CM

## 2025-07-03 ENCOUNTER — TELEPHONE (OUTPATIENT)
Dept: GYNECOLOGY | Facility: CLINIC | Age: 35
End: 2025-07-03

## 2025-07-03 DIAGNOSIS — Z71.89 SURGICAL COUNSELING VISIT: Primary | ICD-10-CM

## 2025-07-03 NOTE — TELEPHONE ENCOUNTER
----- Message from JESSICA Cruz sent at 7/3/2025  8:11 AM CDT -----  Referral sent, patient wishes to discuss hysterectomy. Please schedule with GYN docs.  ----- Message -----  From: Alanna Purvis LPN  Sent: 7/2/2025   4:19 PM CDT  To: JESSICA Cruz; [facility] Staff